# Patient Record
Sex: FEMALE | Race: WHITE | NOT HISPANIC OR LATINO | Employment: FULL TIME | URBAN - METROPOLITAN AREA
[De-identification: names, ages, dates, MRNs, and addresses within clinical notes are randomized per-mention and may not be internally consistent; named-entity substitution may affect disease eponyms.]

---

## 2017-02-23 ENCOUNTER — OFFICE VISIT - HEALTHEAST (OUTPATIENT)
Dept: WOUND CARE | Facility: HOSPITAL | Age: 52
End: 2017-02-23

## 2017-02-23 DIAGNOSIS — C20 RECTAL CANCER (H): ICD-10-CM

## 2017-03-07 ENCOUNTER — AMBULATORY - HEALTHEAST (OUTPATIENT)
Dept: SCHEDULING | Facility: CLINIC | Age: 52
End: 2017-03-07

## 2017-03-07 DIAGNOSIS — Z71.89 OSTOMY NURSE CONSULTATION: ICD-10-CM

## 2017-03-09 ENCOUNTER — OFFICE VISIT - HEALTHEAST (OUTPATIENT)
Dept: WOUND CARE | Facility: HOSPITAL | Age: 52
End: 2017-03-09

## 2017-03-09 DIAGNOSIS — Z71.89 OSTOMY NURSE CONSULTATION: ICD-10-CM

## 2017-05-01 ENCOUNTER — AMBULATORY - HEALTHEAST (OUTPATIENT)
Dept: SCHEDULING | Facility: CLINIC | Age: 52
End: 2017-05-01

## 2017-05-01 DIAGNOSIS — K94.00 COLOSTOMY COMPLICATION (H): ICD-10-CM

## 2017-05-09 ENCOUNTER — OFFICE VISIT - HEALTHEAST (OUTPATIENT)
Dept: WOUND CARE | Facility: HOSPITAL | Age: 52
End: 2017-05-09

## 2017-05-09 DIAGNOSIS — K94.00 COLOSTOMY COMPLICATION (H): ICD-10-CM

## 2017-07-05 ASSESSMENT — MIFFLIN-ST. JEOR: SCORE: 1302.35

## 2017-07-06 ENCOUNTER — ANESTHESIA - HEALTHEAST (OUTPATIENT)
Dept: SURGERY | Facility: HOSPITAL | Age: 52
End: 2017-07-06

## 2017-07-06 ENCOUNTER — SURGERY - HEALTHEAST (OUTPATIENT)
Dept: SURGERY | Facility: HOSPITAL | Age: 52
End: 2017-07-06

## 2017-07-06 ASSESSMENT — MIFFLIN-ST. JEOR: SCORE: 1320.73

## 2017-07-27 ENCOUNTER — OFFICE VISIT - HEALTHEAST (OUTPATIENT)
Dept: WOUND CARE | Facility: HOSPITAL | Age: 52
End: 2017-07-27

## 2017-07-27 DIAGNOSIS — Z43.3 ATTENTION TO COLOSTOMY (H): ICD-10-CM

## 2018-07-18 ENCOUNTER — AMBULATORY - HEALTHEAST (OUTPATIENT)
Dept: SCHEDULING | Facility: CLINIC | Age: 53
End: 2018-07-18

## 2018-07-18 DIAGNOSIS — Z71.89 OSTOMY NURSE CONSULTATION: ICD-10-CM

## 2018-07-26 ENCOUNTER — OFFICE VISIT - HEALTHEAST (OUTPATIENT)
Dept: WOUND CARE | Facility: HOSPITAL | Age: 53
End: 2018-07-26

## 2018-07-26 DIAGNOSIS — Z71.89 OSTOMY NURSE CONSULTATION: ICD-10-CM

## 2018-10-03 ENCOUNTER — AMBULATORY - HEALTHEAST (OUTPATIENT)
Dept: SCHEDULING | Facility: CLINIC | Age: 53
End: 2018-10-03

## 2018-10-03 DIAGNOSIS — Z71.89 ENCOUNTER FOR OSTOMY NURSE CONSULTATION: ICD-10-CM

## 2018-12-10 ENCOUNTER — HOSPITAL ENCOUNTER (OUTPATIENT)
Dept: MAMMOGRAPHY | Facility: CLINIC | Age: 53
Discharge: HOME | End: 2018-12-10
Attending: FAMILY MEDICINE

## 2018-12-10 DIAGNOSIS — Z12.31 VISIT FOR SCREENING MAMMOGRAM: ICD-10-CM

## 2018-12-13 ENCOUNTER — RECORDS - HEALTHEAST (OUTPATIENT)
Dept: ADMINISTRATIVE | Facility: OTHER | Age: 53
End: 2018-12-13

## 2018-12-26 ENCOUNTER — HOSPITAL ENCOUNTER (OUTPATIENT)
Dept: CARDIOLOGY | Facility: HOSPITAL | Age: 53
Discharge: HOME | End: 2018-12-26

## 2018-12-26 ENCOUNTER — RECORDS - HEALTHEAST (OUTPATIENT)
Dept: ADMINISTRATIVE | Facility: OTHER | Age: 53
End: 2018-12-26

## 2018-12-26 DIAGNOSIS — R07.89 CHEST TIGHTNESS: ICD-10-CM

## 2018-12-26 LAB
CV STRESS CURRENT BP HE: NORMAL
CV STRESS CURRENT HR HE: 101
CV STRESS CURRENT HR HE: 104
CV STRESS CURRENT HR HE: 109
CV STRESS CURRENT HR HE: 111
CV STRESS CURRENT HR HE: 112
CV STRESS CURRENT HR HE: 115
CV STRESS CURRENT HR HE: 115
CV STRESS CURRENT HR HE: 118
CV STRESS CURRENT HR HE: 119
CV STRESS CURRENT HR HE: 123
CV STRESS CURRENT HR HE: 126
CV STRESS CURRENT HR HE: 127
CV STRESS CURRENT HR HE: 129
CV STRESS CURRENT HR HE: 132
CV STRESS CURRENT HR HE: 144
CV STRESS CURRENT HR HE: 147
CV STRESS CURRENT HR HE: 163
CV STRESS CURRENT HR HE: 166
CV STRESS CURRENT HR HE: 168
CV STRESS CURRENT HR HE: 168
CV STRESS CURRENT HR HE: 173
CV STRESS CURRENT HR HE: 173
CV STRESS CURRENT HR HE: 58
CV STRESS CURRENT HR HE: 72
CV STRESS CURRENT HR HE: 92
CV STRESS CURRENT HR HE: 93
CV STRESS CURRENT HR HE: 95
CV STRESS CURRENT HR HE: 95
CV STRESS CURRENT HR HE: 96
CV STRESS CURRENT HR HE: 96
CV STRESS CURRENT HR HE: 98
CV STRESS CURRENT HR HE: 98
CV STRESS CURRENT HR HE: 99
CV STRESS DEVIATION TIME HE: NORMAL
CV STRESS ECHO PERCENT HR HE: NORMAL
CV STRESS EXERCISE STAGE HE: NORMAL
CV STRESS FINAL RESTING BP HE: NORMAL
CV STRESS FINAL RESTING HR HE: 98
CV STRESS MAX HR HE: 173
CV STRESS MAX TREADMILL GRADE HE: 18
CV STRESS MAX TREADMILL SPEED HE: 5
CV STRESS PEAK DIA BP HE: NORMAL
CV STRESS PEAK SYS BP HE: NORMAL
CV STRESS PHASE HE: NORMAL
CV STRESS PROTOCOL HE: NORMAL
CV STRESS RESTING PT POSITION HE: NORMAL
CV STRESS RESTING PT POSITION HE: NORMAL
CV STRESS ST DEVIATION AMOUNT HE: NORMAL
CV STRESS ST DEVIATION ELEVATION HE: NORMAL
CV STRESS ST EVELATION AMOUNT HE: NORMAL
CV STRESS TEST TYPE HE: NORMAL
CV STRESS TOTAL STAGE TIME MIN 1 HE: NORMAL
ECHO EJECTION FRACTION ESTIMATED: 60 %
STRESS ECHO BASELINE BP: NORMAL
STRESS ECHO BASELINE HR: 58
STRESS ECHO CALCULATED PERCENT HR: 103 %
STRESS ECHO LAST STRESS BP: NORMAL
STRESS ECHO LAST STRESS HR: 173
STRESS ECHO POST ESTIMATED WORKLOAD: 13.3
STRESS ECHO POST EXERCISE DUR MIN: 12
STRESS ECHO POST EXERCISE DUR SEC: 55
STRESS ECHO TARGET HR: 143

## 2018-12-26 RX ORDER — SOLIFENACIN SUCCINATE 5 MG/1
5 TABLET, FILM COATED ORAL DAILY
Status: SHIPPED | COMMUNITY
Start: 2018-12-26

## 2021-05-25 ENCOUNTER — RECORDS - HEALTHEAST (OUTPATIENT)
Dept: ADMINISTRATIVE | Facility: CLINIC | Age: 56
End: 2021-05-25

## 2021-05-31 VITALS — BODY MASS INDEX: 32.81 KG/M2 | HEIGHT: 61 IN | WEIGHT: 173.8 LBS

## 2021-06-02 ENCOUNTER — RECORDS - HEALTHEAST (OUTPATIENT)
Dept: ADMINISTRATIVE | Facility: CLINIC | Age: 56
End: 2021-06-02

## 2021-06-09 NOTE — PROGRESS NOTES
"OUTPATIENT OSTOMY ASSESSMENT    Patients mode of arrival: Ambulatory    INTAKE  Type of Stoma: Permanent Colostomy  Anticipated date of takedown: NA    Diagnosis Pertinent to Stoma:Colon/Rectal Cancer Date of Diagnosis: 2016  Type of Surgery: Colostomy    Surgery Date: April 2016  [facility]: [facility]    Purpose of this visit:Checkup:for granulomas    Present for Teaching Session: Patient   Present: NA    Pertinent Information: Patient reports that the granulomas \"look bigger than before\"    Current Equipment:    Product # Brand Description   Pouch [id number] Dorinda Flat CTF   Flange      Paste      Powder      Deodorizer                    Pouch Change Frequency: twice weekly  Provider of Care: Emptying: self Pouch Change: self    ASSESSMENT  Stoma Size: Oval 3/4x1-1/8 inches  Protrusion: Flush  Vascularity: Pink  Mucocutaneous Juncture: Intact  Peristomal Skin: Abnormal granuloma at 2 o'clock measuring 0.3x0.3cm and 12 o'clock measuring 0.2x0.2cm. Both bleeding.    Wound: No  Current Wound Care: NA    TREATMENT  Silver Nitrate to : 1 # of Sticks used: granulomas    INTERVENTIONS  Refitting done and Educated on pouch change procedure  Miscellaneous Interventions: NA    INSTRUCTIONS GIVEN  WOC Role, Anatomy, Bathing: Ostomy supplies are waterproof., Pouching Products: Showed pouching system , Insurance and Ordering supplies    PLAN  Change in Supplies: See Outpatient Ostomy Instructions      Total Time Spent with Patient: 25 minutes.  Education time: 10 minutes.  Wound care: 0 minutes.    "

## 2021-06-09 NOTE — PROGRESS NOTES
OUTPATIENT OSTOMY ASSESSMENT    Patients mode of arrival: Ambulatory    INTAKE  Type of Stoma: Permanent Colostomy  Anticipated date of takedown: NA    Diagnosis Pertinent to Stoma:Colon/Rectal Cancer Date of Diagnosis: 1/11/16  Type of Surgery: APR    Surgery Date: 4/28/16  Surgeon:Rakesh Guajardo: [facility]    Purpose of this visit:Leaking Problem    Present for Teaching Session: Patient   Present: NA    Pertinent Information: Patient has stool occasionally getting under pouch    Current Equipment:    Product # Brand Description   Pouch 50353 Ros[address] Flat CTF   Flange      Paste      Powder 7906 [address] Adapt powder   Deodorizer                    Pouch Change Frequency: Every 2 days  Provider of Care: Emptying: self Pouch Change: self    ASSESSMENT  Stoma Size: Oval 1 x 1 1/4 inches  Protrusion: Flush  Vascularity: Pink, 2 granulomas on stoma at 12 o'clock both measuring 0.5x0.5cm  Mucocutaneous Juncture: Intact  Peristomal Skin: Intact    Wound: No  Current Wound Care: NA    TREATMENT  Silver Nitrate to : granulomas # of Sticks used: 1    INTERVENTIONS  Refitting done and Educated on pouch change procedure  Miscellaneous Interventions: Signed up for Coloplast Care or Secure Start    INSTRUCTIONS GIVEN  WOC Role, Activity, Clothing, Fluids: Drink 6-8 glasses of fluids daily  and Assessories     PLAN  Change in Supplies: See Outpatient Ostomy Instructions      Total Time Spent with Patient: 45 minutes.  Education time: 25 minutes.

## 2021-06-10 NOTE — PROGRESS NOTES
OUTPATIENT OSTOMY ASSESSMENT    Patients mode of arrival: Ambulatory    INTAKE  Type of Stoma: Permanent Colostomy  Anticipated date of takedown: NA    Diagnosis Pertinent to Stoma:Colon/Rectal Cancer Date of Diagnosis: 2016  Type of Surgery: Colostomy    Surgery Date: April 2016  Surgeon: Rakesh     [facility]: [facility]    Purpose of this visit:Leaking Problem    Present for Teaching Session: Patient   Present: NA    Pertinent Information: Having frequent leaking issues- at 3 o'clock on the stoma    Current Equipment:    Product # Brand Description   Pouch 49990 Coloplast Soft convex CTF   Flange      Paste [id number] Coloplast Slim barrier ring   Powder      Deodorizer                    Pouch Change Frequency: 5-7 times weekly  Provider of Care: Emptying: self Pouch Change: self    ASSESSMENT  Stoma Size: Oval 3/4x1-1/4 inches  Protrusion: Flush. Os points directly towards 3 o'clock  Vascularity: Pink  Mucocutaneous Juncture: Intact- granuloma at 12 o'clock  Peristomal Skin: Intact    Wound: No  Current Wound Care: NA    TREATMENT  Silver Nitrate to : granuloma # of Sticks used: 1    INTERVENTIONS  Refitting done  Miscellaneous Interventions: NA    INSTRUCTIONS GIVEN  WOC Role, Anatomy, Clothing, Fluids: Drink 6-8 glasses of fluids daily , Pouching Products: Showed pouching system , Insurance and Ordering supplies    PLAN  Change in Supplies: See Outpatient Ostomy Instructions      Total Time Spent with Patient: 45 minutes.  Education time: 30 minutes.  Wound care: 0 minutes.

## 2021-06-11 NOTE — ANESTHESIA POSTPROCEDURE EVALUATION
Patient: Krystyna Hunter  COLOSTOMY REVISION  Anesthesia type: general    Patient location: PACU  Last vitals:   Vitals:    07/06/17 1545   BP: 145/69   Pulse: 74   Resp: 16   Temp: 36.3  C (97.4  F)   SpO2: 98%     Post vital signs: stable  Level of consciousness: awake and responds to simple questions  Post-anesthesia pain: pain controlled  Post-anesthesia nausea and vomiting: no  Pulmonary: unassisted, return to baseline  Cardiovascular: stable and blood pressure at baseline  Hydration: adequate  Anesthetic events: no    QCDR Measures:  ASA# 11 - Yolande-op Cardiac Arrest: ASA11B - Patient did NOT experience unanticipated cardiac arrest  ASA# 12 - Yolande-op Mortality Rate: ASA12B - Patient did NOT die  ASA# 13 - PACU Re-Intubation Rate: ASA13B - Patient did NOT require a new airway mgmt  ASA# 10 - Composite Anes Safety: ASA10A - No serious adverse event  ASA# 38 - New Corneal Injury: ASA38A - No new exposure keratitis or corneal abrasion in PACU    Additional Notes:

## 2021-06-11 NOTE — ANESTHESIA CARE TRANSFER NOTE
Last vitals:   Vitals:    07/06/17 1335   BP: 135/82   Pulse: 83   Resp: 18   Temp: 36.5  C (97.7  F)   SpO2: 100%     Patient's level of consciousness is drowsy  Spontaneous respirations: yes  Maintains airway independently: yes  Dentition unchanged: yes  Oropharynx: oropharynx clear of all foreign objects    QCDR Measures:  ASA# 20 - Surgical Safety Checklist: ASA20A - Safety Checks Done  PQRS# 430 - Adult PONV Prevention: 4558F - Pt received => 2 anti-emetic agents (different classes) preop & intraop  ASA# 8 - Peds PONV Prevention: NA - Not pediatric patient, not GA or 2 or more risk factors NOT present  PQRS# 424 - Yolande-op Temp Management: 4559F - At least one body temp DOCUMENTED => 35.5C or 95.9F within required timeframe  PQRS# 426 - PACU Transfer Protocol: - Transfer of care checklist used  ASA# 14 - Acute Post-op Pain: ASA14B - Patient did NOT experience pain >= 7 out of 10

## 2021-06-12 NOTE — PROGRESS NOTES
[facility]: MED RECONCILIATION    Comments/Recommendations:    Patient is awake and feeling very sick but is able to complete the interview.  Verifies allergies   Drug drug interaction noted between seroquel and citalopram as increased risk of prolonged QTc    Medications added:     · None    Medications removed:    · Adderall 20mg daily    Medications adjusted:    · Vitamin D changed to 2000 units five days per week and 4000 units on Saturday and Sunday  · Adderall changed to 15mg tid prn attention and focus  · Lorazepam changed to 1mg bid - The patient confirms she takes this twice daily scheduled most days  · Quetiapine 25mg changed to one to two tablets at night as needed for insomnia and anxiety    Medication reconciliation completed with patients own medications at the patients bedside  · Counseled the patient to:  · Not use her own medication while in the hospital unless otherwise instructed  · Have a family member take the medication home as soon as possible  · If medications cannot be taken home, request that the patient notify the nurse so the medications may be locked up according to hospital policy  · The briannet reports her spouse Pipo Hester has left for the evening and will not be back until tomorrow  · Nurse notified to contact security to secure medications in accordance with policy [id number]      Allergies: Egg    Prior to Admission Medications:     Prior to Admission Medications   Prescriptions Last Dose Informant Patient Reported? Taking? LORazepam (ATIVAN) 1 mg tablet 9/29/2017 at Unknown time  Yes Yes   Sig: Take 1 mg by mouth two (2) times a day. QUEtiapine (SEROQUEL) 25 mg tablet   Yes Yes   Sig: Take 25-50 mg by mouth nightly as needed (insomnia, anxiety). cholecalciferol, vitamin D3, (VITAMIN D3) 2,000 unit tab 9/29/2017 at Unknown time  Yes Yes   Sig: Take 2,000 Units by mouth five (5) days a week.  The patient takes 2000 units Monday, Tuesday, Wednesday, Thursday, Friday   cholecalciferol, OUTPATIENT OSTOMY ASSESSMENT    Patients mode of arrival: Ambulatory    INTAKE  Type of Stoma: Permanent Colostomy  Anticipated date of takedown: NA    Diagnosis Pertinent to Stoma:Colon/Rectal Cancer Date of Diagnosis: Jan 2016  Type of Surgery: Colostomy revision   Surgery Date: 7/6/17  Surgeon:Rakesh     [facility]: [facility]    Purpose of this visit:Checkup:2 week    Present for Teaching Session: Patient   Present: NA    Pertinent Information: Comes in for routine follow up    Current Equipment:    Product # Brand Description   Pouch 79845 [place] 2 piece    Flange 41982 [place] Convex CTF   Paste 7805 [address] Adapt ring   Powder      Deodorizer                    Pouch Change Frequency: Pouch every 2 days, Flange every 4 days  Provider of Care: Emptying: self Pouch Change: self    ASSESSMENT  Stoma Size: Oval 1 x 1 1/4 inches  Protrusion: 1cm  Vascularity: Pink  Mucocutaneous Juncture: Intact  Peristomal Skin: Intact    Wound: No  Current Wound Care: NA    TREATMENT  NA    INTERVENTIONS  Refitting done and Educated on pouch change procedure  Miscellaneous Interventions: Signed up for Coloplast Care or Secure Start    INSTRUCTIONS GIVEN  WOC Role, Anatomy, Fluids: Drink 6-8 glasses of fluids daily , Pouching Products: Showed pouching system  and Ordering supplies    PLAN  Change in Supplies: See Outpatient Ostomy Instructions      Total Time Spent with Patient: 35 minutes.  Education time: 15 minutes.     vitamin D3, (VITAMIN D3) 2,000 unit tab 9/24/2017  Yes Yes   Sig: Take 4,000 Units by mouth two (2) days a week. The patient takes 4000 units on Saturday and Sunday   citalopram (CELEXA) 20 mg tablet 9/29/2017 at Unknown time  No Yes   Sig: TAKE 1 TABLET BY MOUTH EVERY MORNING   dextroamphetamine-amphetamine (ADDERALL) 15 mg tablet 9/23/2017 at Unknown time  Yes Yes   Sig: Take 15 mg by mouth three (3) times daily as needed (focus, attention). dilTIAZem CD (CARDIZEM CD) 180 mg ER capsule 9/29/2017 at Unknown time  No Yes   Sig: Take 1 Cap by mouth daily. levothyroxine (SYNTHROID) 112 mcg tablet 9/29/2017 at Unknown time  Yes Yes   Sig: Take 112 mcg by mouth Daily (before breakfast). nitrofurantoin, macrocrystal-monohydrate, (MACROBID) 100 mg capsule 9/29/2017 at Unknown time  No Yes   Sig: Take 1 Cap by mouth two (2) times a day. phenazopyridine (PYRIDIUM) 100 mg tablet   No Yes   Sig: Take 1 Tab by mouth three (3) times daily as needed for Pain for up to 3 days.  Caution can cause orange staining on clothes from sweating, tears, urine   simvastatin (ZOCOR) 10 mg tablet 9/29/2017 at Unknown time  No Yes   Sig: TAKE 1 TABLET BY MOUTH NIGHTLY   venlafaxine-SR (EFFEXOR-XR) 150 mg capsule 9/29/2017 at Unknown time  No Yes   Sig: TAKE ONE CAPSULE BY MOUTH EVERY DAY      Facility-Administered Medications: None      Thank you,    Filomena Osgood, PharmD, BCPS

## 2021-06-19 NOTE — PROGRESS NOTES
OUTPATIENT OSTOMY ASSESSMENT    Patients mode of arrival: Ambulatory    INTAKE  Type of Stoma: Permanent Colostomy  Anticipated date of takedown: NA    Diagnosis Pertinent to Stoma:Colon/Rectal Cancer Date of Diagnosis: Jan 2016  Type of Surgery: Colostomy    Surgery Date: Revision 7/6/17  Surgeon:Rakesh     [facility]: [facility]    Purpose of this visit:Checkup:1 year    Present for Teaching Session: Patient   Present: NA    Pertinent Information: One irritated spot on stoma    Current Equipment:    Product # Brand Description   Pouch 86159 Coloplast 2 piece   Flange 15725 Coloplast 1 1/4 inch convex light   Paste 998494 Coloplast Thin ring   Powder      Deodorizer                    Pouch Change Frequency: Every 2-3 days  Provider of Care: Emptying: self Pouch Change: self    ASSESSMENT  Stoma Size: Oval 1 x 1 1/8 inches  Protrusion: Flush  Vascularity: Pink, 2 granulomas on stoma each measuring 0.2x0.2cm  Mucocutaneous Juncture: Intact  Peristomal Skin: Intact    Wound: No  Current Wound Care: NA    TREATMENT  Silver Nitrate to : granulomas # of Sticks used: 1    INTERVENTIONS  Refitting done    INSTRUCTIONS GIVEN  WOC Role, Anatomy, Clothing, Diet, Exercise, Fluids: Drink 6-8 glasses of fluids daily , Insurance and Ordering supplies    PLAN  Continue same Pouching System      Total Time Spent with Patient: 30 minutes.  Education time: 10 minutes.

## 2021-06-27 ENCOUNTER — HEALTH MAINTENANCE LETTER (OUTPATIENT)
Age: 56
End: 2021-06-27

## 2021-07-03 NOTE — ANESTHESIA PREPROCEDURE EVALUATION
Anesthesia Preprocedure Evaluation by Aldo Singer MD at 7/6/2017 12:07 PM     Author: Aldo Singer MD Service: -- Author Type: Physician    Filed: 7/6/2017 12:08 PM Date of Service: 7/6/2017 12:07 PM Status: Addendum    : Aldo Singer MD (Physician)    Related Notes: Original Note by Aldo Singer MD (Physician) filed at 7/6/2017 12:07 PM       Anesthesia Evaluation      Patient summary reviewed     Airway   Mallampati: II  Neck ROM: full   Pulmonary - negative ROS and normal exam    breath sounds clear to auscultation                         Cardiovascular - negative ROS  Rhythm: regular  Rate: normal,         Neuro/Psych      Endo/Other       GI/Hepatic/Renal - negative ROS      Other findings: Colon cancer      Dental    (+) caps                           Anesthesia Plan  Planned anesthetic: general endotracheal    ASA 2   Induction: intravenous   Anesthetic plan and risks discussed with: patient    Post-op plan: routine recovery

## 2021-07-13 ENCOUNTER — RECORDS - HEALTHEAST (OUTPATIENT)
Dept: ADMINISTRATIVE | Facility: CLINIC | Age: 56
End: 2021-07-13

## 2021-07-21 ENCOUNTER — RECORDS - HEALTHEAST (OUTPATIENT)
Dept: ADMINISTRATIVE | Facility: CLINIC | Age: 56
End: 2021-07-21

## 2021-07-22 ENCOUNTER — RECORDS - HEALTHEAST (OUTPATIENT)
Dept: SCHEDULING | Facility: CLINIC | Age: 56
End: 2021-07-22

## 2021-07-22 DIAGNOSIS — Z12.31 OTHER SCREENING MAMMOGRAM: ICD-10-CM

## 2021-09-03 ENCOUNTER — TELEPHONE (OUTPATIENT)
Dept: WOUND CARE | Facility: HOSPITAL | Age: 56
End: 2021-09-03

## 2021-10-01 DIAGNOSIS — Z11.59 ENCOUNTER FOR SCREENING FOR OTHER VIRAL DISEASES: ICD-10-CM

## 2021-10-17 ENCOUNTER — HEALTH MAINTENANCE LETTER (OUTPATIENT)
Age: 56
End: 2021-10-17

## 2021-10-19 ENCOUNTER — LAB (OUTPATIENT)
Dept: FAMILY MEDICINE | Facility: CLINIC | Age: 56
End: 2021-10-19
Payer: COMMERCIAL

## 2021-10-19 ENCOUNTER — HOSPITAL ENCOUNTER (OUTPATIENT)
Dept: WOUND CARE | Facility: HOSPITAL | Age: 56
Discharge: HOME | End: 2021-10-19
Attending: COLON & RECTAL SURGERY | Admitting: COLON & RECTAL SURGERY
Payer: COMMERCIAL

## 2021-10-19 DIAGNOSIS — Z11.59 ENCOUNTER FOR SCREENING FOR OTHER VIRAL DISEASES: ICD-10-CM

## 2021-10-19 PROCEDURE — U0005 INFEC AGEN DETEC AMPLI PROBE: HCPCS | Performed by: OBSTETRICS & GYNECOLOGY

## 2021-10-19 PROCEDURE — 999N000196 HC STATISTIC WOC PT EDUCATION, > 60 MIN

## 2021-10-19 NOTE — DISCHARGE INSTRUCTIONS
OUTPATIENT OSTOMY INSTRUCTIONS                                                                        Type of Stoma: Colostomy         [phone]      Equipment:    Product # Brand Description   Pouch  Coloplast 2 pc Drainable   Flange   * convex   Paste Coloplast Coloplast Brava   Powder 7906 [address]inda Adapt   Liquid Skin Barrier 3344 3M Cavilon No Sting               Deodorizer   Coloplast   Optional pouch           Procedure:  Close the bottom of the pouch.  If needed cut the opening of your pouch to the correct size (follow pattern or 1/8 inch larger than stoma) and then remove backings from adhesive surfaces.  Remove the soiled pouch and discard.  Wash skin with water and dry.    Then: Apply powder to open areas only, brush off excess powder., Apply No-Sting over powdered area. and Apply Skin Prep over powdered area.  Then: Apply Ring/Paste: Around stoma.  in order to cover top and bottom skin           Then: Apply pouching system to stoma site and hold in place for 2-5 minutes.     ______________________________________________________________________________    Pouch Change: Twice weekly       WOC Nurse Specialist: Aubree                  Questions: [facility] [phone] ()      Follow-up Appointment: As needed. Please call [facility] [phone] () to request an appointment.

## 2021-10-20 ENCOUNTER — ANESTHESIA EVENT (OUTPATIENT)
Dept: SURGERY | Facility: AMBULATORY SURGERY CENTER | Age: 56
End: 2021-10-20
Payer: COMMERCIAL

## 2021-10-20 LAB — SARS-COV-2 RNA RESP QL NAA+PROBE: NEGATIVE

## 2021-10-20 ASSESSMENT — MIFFLIN-ST. JEOR: SCORE: 1285.35

## 2021-10-21 ENCOUNTER — ANESTHESIA (OUTPATIENT)
Dept: SURGERY | Facility: AMBULATORY SURGERY CENTER | Age: 56
End: 2021-10-21
Payer: COMMERCIAL

## 2021-10-21 ENCOUNTER — HOSPITAL ENCOUNTER (OUTPATIENT)
Facility: AMBULATORY SURGERY CENTER | Age: 56
End: 2021-10-21
Attending: OBSTETRICS & GYNECOLOGY
Payer: COMMERCIAL

## 2021-10-21 VITALS
WEIGHT: 166 LBS | BODY MASS INDEX: 31.34 KG/M2 | TEMPERATURE: 96 F | OXYGEN SATURATION: 99 % | DIASTOLIC BLOOD PRESSURE: 72 MMHG | HEIGHT: 61 IN | RESPIRATION RATE: 16 BRPM | SYSTOLIC BLOOD PRESSURE: 142 MMHG | HEART RATE: 60 BPM

## 2021-10-21 DIAGNOSIS — T83.39XA RETAINED INTRAUTERINE CONTRACEPTIVE DEVICE (IUD): Primary | ICD-10-CM

## 2021-10-21 RX ORDER — IBUPROFEN 800 MG/1
800 TABLET, FILM COATED ORAL EVERY 6 HOURS PRN
Qty: 30 TABLET | Refills: 0 | Status: SHIPPED | OUTPATIENT
Start: 2021-10-21

## 2021-10-21 RX ORDER — ONDANSETRON 2 MG/ML
4 INJECTION INTRAMUSCULAR; INTRAVENOUS EVERY 30 MIN PRN
Status: DISCONTINUED | OUTPATIENT
Start: 2021-10-21 | End: 2021-10-22 | Stop reason: HOSPADM

## 2021-10-21 RX ORDER — IBUPROFEN 800 MG/1
800 TABLET, FILM COATED ORAL ONCE
Status: COMPLETED | OUTPATIENT
Start: 2021-10-21 | End: 2021-10-21

## 2021-10-21 RX ORDER — PROPOFOL 10 MG/ML
INJECTION, EMULSION INTRAVENOUS CONTINUOUS PRN
Status: DISCONTINUED | OUTPATIENT
Start: 2021-10-21 | End: 2021-10-21

## 2021-10-21 RX ORDER — SODIUM CHLORIDE, SODIUM LACTATE, POTASSIUM CHLORIDE, CALCIUM CHLORIDE 600; 310; 30; 20 MG/100ML; MG/100ML; MG/100ML; MG/100ML
INJECTION, SOLUTION INTRAVENOUS CONTINUOUS
Status: DISCONTINUED | OUTPATIENT
Start: 2021-10-21 | End: 2021-10-22 | Stop reason: HOSPADM

## 2021-10-21 RX ORDER — ONDANSETRON 4 MG/1
4 TABLET, ORALLY DISINTEGRATING ORAL EVERY 30 MIN PRN
Status: DISCONTINUED | OUTPATIENT
Start: 2021-10-21 | End: 2021-10-22 | Stop reason: HOSPADM

## 2021-10-21 RX ORDER — TRIAMCINOLONE ACETONIDE 0.25 MG/G
CREAM TOPICAL 2 TIMES DAILY
COMMUNITY

## 2021-10-21 RX ORDER — ACETAMINOPHEN 325 MG/1
975 TABLET ORAL ONCE
Status: DISCONTINUED | OUTPATIENT
Start: 2021-10-21 | End: 2021-10-22 | Stop reason: HOSPADM

## 2021-10-21 RX ORDER — FLUOROURACIL 50 MG/G
CREAM TOPICAL 2 TIMES DAILY
COMMUNITY

## 2021-10-21 RX ORDER — LIDOCAINE HYDROCHLORIDE 20 MG/ML
INJECTION, SOLUTION INFILTRATION; PERINEURAL PRN
Status: DISCONTINUED | OUTPATIENT
Start: 2021-10-21 | End: 2021-10-21

## 2021-10-21 RX ORDER — CLOBETASOL PROPIONATE 0.5 MG/G
CREAM TOPICAL 2 TIMES DAILY
COMMUNITY

## 2021-10-21 RX ORDER — CEPHALEXIN 500 MG/1
500 CAPSULE ORAL 2 TIMES DAILY
Qty: 10 CAPSULE | Refills: 0 | Status: SHIPPED | OUTPATIENT
Start: 2021-10-21 | End: 2021-10-26

## 2021-10-21 RX ORDER — FENTANYL CITRATE 50 UG/ML
25 INJECTION, SOLUTION INTRAMUSCULAR; INTRAVENOUS
Status: DISCONTINUED | OUTPATIENT
Start: 2021-10-21 | End: 2021-10-22 | Stop reason: HOSPADM

## 2021-10-21 RX ORDER — FENTANYL CITRATE 50 UG/ML
25 INJECTION, SOLUTION INTRAMUSCULAR; INTRAVENOUS EVERY 5 MIN PRN
Status: CANCELLED | OUTPATIENT
Start: 2021-10-21

## 2021-10-21 RX ORDER — ONDANSETRON 2 MG/ML
INJECTION INTRAMUSCULAR; INTRAVENOUS PRN
Status: DISCONTINUED | OUTPATIENT
Start: 2021-10-21 | End: 2021-10-21

## 2021-10-21 RX ORDER — DEXAMETHASONE SODIUM PHOSPHATE 4 MG/ML
INJECTION, SOLUTION INTRA-ARTICULAR; INTRALESIONAL; INTRAMUSCULAR; INTRAVENOUS; SOFT TISSUE PRN
Status: DISCONTINUED | OUTPATIENT
Start: 2021-10-21 | End: 2021-10-21

## 2021-10-21 RX ORDER — LIDOCAINE 40 MG/G
CREAM TOPICAL
Status: DISCONTINUED | OUTPATIENT
Start: 2021-10-21 | End: 2021-10-22 | Stop reason: HOSPADM

## 2021-10-21 RX ORDER — PHENAZOPYRIDINE HYDROCHLORIDE 100 MG/1
100 TABLET, FILM COATED ORAL 3 TIMES DAILY PRN
Qty: 10 TABLET | Refills: 0 | Status: SHIPPED | OUTPATIENT
Start: 2021-10-21 | End: 2021-10-24

## 2021-10-21 RX ORDER — FENTANYL CITRATE 50 UG/ML
INJECTION, SOLUTION INTRAMUSCULAR; INTRAVENOUS PRN
Status: DISCONTINUED | OUTPATIENT
Start: 2021-10-21 | End: 2021-10-21

## 2021-10-21 RX ADMIN — IBUPROFEN 800 MG: 800 TABLET, FILM COATED ORAL at 11:52

## 2021-10-21 RX ADMIN — FENTANYL CITRATE 25 MCG: 50 INJECTION, SOLUTION INTRAMUSCULAR; INTRAVENOUS at 11:01

## 2021-10-21 RX ADMIN — FENTANYL CITRATE 50 MCG: 50 INJECTION, SOLUTION INTRAMUSCULAR; INTRAVENOUS at 10:51

## 2021-10-21 RX ADMIN — LIDOCAINE HYDROCHLORIDE 40 MG: 20 INJECTION, SOLUTION INFILTRATION; PERINEURAL at 10:51

## 2021-10-21 RX ADMIN — DEXAMETHASONE SODIUM PHOSPHATE 4 MG: 4 INJECTION, SOLUTION INTRA-ARTICULAR; INTRALESIONAL; INTRAMUSCULAR; INTRAVENOUS; SOFT TISSUE at 10:55

## 2021-10-21 RX ADMIN — PROPOFOL 200 MCG/KG/MIN: 10 INJECTION, EMULSION INTRAVENOUS at 10:51

## 2021-10-21 RX ADMIN — FENTANYL CITRATE 25 MCG: 50 INJECTION, SOLUTION INTRAMUSCULAR; INTRAVENOUS at 10:53

## 2021-10-21 RX ADMIN — SODIUM CHLORIDE, SODIUM LACTATE, POTASSIUM CHLORIDE, CALCIUM CHLORIDE: 600; 310; 30; 20 INJECTION, SOLUTION INTRAVENOUS at 10:37

## 2021-10-21 RX ADMIN — ONDANSETRON 4 MG: 2 INJECTION INTRAMUSCULAR; INTRAVENOUS at 10:55

## 2021-10-21 ASSESSMENT — MIFFLIN-ST. JEOR: SCORE: 1285.35

## 2021-10-21 NOTE — ANESTHESIA CARE TRANSFER NOTE
Patient: Krystyna Hunter    Procedure: Procedure(s):  HYSTEROSCOPY, WITH DILATION AND CURETTAGE, WITH REMOVAL OF MIRENA DEVICE UNDER ULTRASOUND GUIDANCE       Diagnosis: Retained intrauterine contraceptive device (IUD) [T83.39XA]  Diagnosis Additional Information: No value filed.    Anesthesia Type:   MAC     Note:    Oropharynx: oropharynx clear of all foreign objects and spontaneously breathing  Level of Consciousness: awake  Oxygen Supplementation: room air    Independent Airway: airway patency satisfactory and stable    Vital Signs Stable: post-procedure vital signs reviewed and stable  Report to RN Given: handoff report given  Patient transferred to: Phase II    Handoff Report: Identifed the Patient, Identified the Reponsible Provider, Reviewed the pertinent medical history, Discussed the surgical course, Reviewed Intra-OP anesthesia mangement and issues during anesthesia, Set expectations for post-procedure period and Allowed opportunity for questions and acknowledgement of understanding      Vitals:  Vitals Value Taken Time   /68 10/21/21 1109   Temp 96  F (35.6  C) 10/21/21 1109   Pulse 79 10/21/21 1109   Resp 16 10/21/21 1109   SpO2 95 % 10/21/21 1109       Electronically Signed By: BLAKE Petersen CRNA  October 21, 2021  11:11 AM

## 2021-10-21 NOTE — ANESTHESIA PREPROCEDURE EVALUATION
Anesthesia Pre-Procedure Evaluation    Patient: Krystyna Hunter   MRN: [id number] : 1965        Preoperative Diagnosis: Retained intrauterine contraceptive device (IUD) [T83.39XA]    Procedure : Procedure(s):  HYSTEROSCOPY, WITH DILATION AND CURETTAGE, WITH REMOVAL OF MIRENA DEVICE UNDER ULTRASOUND GUIDANCE          Past Medical History:   Diagnosis Date     Dyspnea on exertion      Gastroesophageal reflux disease      Orthopnea      Thyroid disease       Past Surgical History:   Procedure Laterality Date     COLON SURGERY  2016    with colostomy     REPAIR BLADDER  2014     REVISION COLOSTOMY N/A 2017    Procedure: COLOSTOMY REVISION;  Surgeon: Shant Cameron MD;  Location: [facility];  Service:       Allergies   Allergen Reactions     Claritin-D 12 Hour [Claritin-D] Hives     Hydrocodone Unknown     Facial swelling     Nitrofurantoin Other (See Comments) and Swelling     numbness      Social History     Tobacco Use     Smoking status: Never Smoker     Smokeless tobacco: Never Used   Substance Use Topics     Alcohol use: Yes     Comment: Alcoholic Drinks/day: occasionaly, 2 times per week 3-4 glasses of adult beverages      Wt Readings from Last 1 Encounters:   10/21/21 75.3 kg (166 lb)        Anesthesia Evaluation   Pt has had prior anesthetic.     No history of anesthetic complications       ROS/MED HX  ENT/Pulmonary:  - neg pulmonary ROS     Neurologic:  - neg neurologic ROS     Cardiovascular:  - neg cardiovascular ROS     METS/Exercise Tolerance:     Hematologic:  - neg hematologic  ROS     Musculoskeletal:  - neg musculoskeletal ROS     GI/Hepatic:     (+) GERD, Asymptomatic on medication,     Renal/Genitourinary:  - neg Renal ROS     Endo:     (+) thyroid problem, hypothyroidism,     Psychiatric/Substance Use:  - neg psychiatric ROS     Infectious Disease:  - neg infectious disease ROS     Malignancy: Comment: Hx of rectal cancer s/p chemotherapy, radiation and surgical resection  with ostomy in place      Other:  - neg other ROS          Physical Exam    Airway  airway exam normal      Mallampati: III   TM distance: > 3 FB   Neck ROM: full   Mouth opening: > 3 cm    Respiratory Devices and Support         Dental  no notable dental history         Cardiovascular   cardiovascular exam normal       Rhythm and rate: regular and normal     Pulmonary   pulmonary exam normal        breath sounds clear to auscultation           OUTSIDE LABS:  CBC: No results found for: WBC, HGB, HCT, PLT  BMP:   Lab Results   Component Value Date    CR 0.72 04/28/2016     COAGS: No results found for: PTT, INR, FIBR  POC: No results found for: BGM, HCG, HCGS  HEPATIC: No results found for: ALBUMIN, PROTTOTAL, ALT, AST, GGT, ALKPHOS, BILITOTAL, BILIDIRECT, WILBUR  OTHER: No results found for: PH, LACT, A1C, BILLY, PHOS, MAG, LIPASE, AMYLASE, TSH, T4, T3, CRP, SED    Anesthesia Plan    ASA Status:  2   NPO Status:  NPO Appropriate    Anesthesia Type: MAC.     - Reason for MAC: straight local not clinically adequate              Consents    Anesthesia Plan(s) and associated risks, benefits, and realistic alternatives discussed. Questions answered and patient/representative(s) expressed understanding.     - Discussed with:  Patient         Postoperative Care    Pain management: IV analgesics, Oral pain medications, Multi-modal analgesia.   PONV prophylaxis: Ondansetron (or other 5HT-3), Dexamethasone or Solumedrol, Droperidol or Haldol, Background Propofol Infusion     Comments:                JULISA YOUNG MD

## 2021-10-21 NOTE — OP NOTE
Hysteroscopy, D&C, Removal of IUD    Name: Krystyna Hunter   MRN: [id number]   DATE OF SERVICE:  10/21/2021     PREOPERATIVE DIAGNOSIS: retained Mirena IUD    POSTOPERATIVE DIAGNOSIS: same    PROCEDURES: Removal of IUD under US guidance    SURGEON: Princess Lan DO     ASSISTANT: none      ANESTHESIA:  mac      ESTIMATED BLOOD LOSS:   0 cc      HISTORY OF PRESENT ILLNESS:  This is a 56 year old year old female with history of retained IUD. Patient has history of placement of IUD in 2015 and subsequently was diagnosed with colon cancer and underwent radiation. Attempt to remove IUD in the office was unsuccessful so decision was made to complete in the OR under US guidance. She was given informed consent for the above procedure. The risks, benefits and alternatives were discussed with her. She expressed understanding and wished to proceed.       PROCEDURE:  The patient was brought to the operating room and after induction of general anesthesia was prepped and draped in the dorsal lithotomy position. A time out was called and the patient and the procedure were verified.  A bimanual exam was done, indicating cervix flush with vaginal wall, small anteverted uterus.     A sterile  speculum was placed. The cervix was again noted to be flush with vaginal wall but external os able to be identified due to IUD strings. Just above the IUD stings the anterior lip of the cervix was grasped with single tooth tenaculum. US guidance was used but it was difficult to identify the uterus as patient had an empty bladder, tenaculum placement was confirmed to be on the cervix. A ring forcep was then used to grasp the IUD. Steady downward traction allowed for removal of the mirena IUD which was inspected and removed in its entirety. No bleeding noted from the os at this time. The tenaculum was removed from the cervix and at this point good hemostasis was noted with this portion of the procedure. Instruments were removed from vagina. No  active bleeding was noted. Sponge and needle counts were correct X 2. She was taken to recovery in stable condition.      Princess Lan,   [facility]  [phone]

## 2021-10-21 NOTE — DISCHARGE INSTRUCTIONS
Ibuprofen last given at 11:55am. Do not take any additional NSAID's (Ibuprofen, Motrin, Advil, Naproxen, Aleve) until 5:55pm or later. Take with food.    Call Dr. Lan office if you have any questions or concerns:  [phone]

## 2021-10-21 NOTE — ANESTHESIA POSTPROCEDURE EVALUATION
Patient: Krystyna Hunter    Procedure: Procedure(s):  HYSTEROSCOPY, WITH DILATION AND CURETTAGE, WITH REMOVAL OF MIRENA DEVICE UNDER ULTRASOUND GUIDANCE       Diagnosis:Retained intrauterine contraceptive device (IUD) [T83.39XA]  Diagnosis Additional Information: No value filed.    Anesthesia Type:  MAC    Note:  Disposition: Outpatient   Postop Pain Control: Uneventful            Sign Out: Well controlled pain   PONV: No   Neuro/Psych: Uneventful            Sign Out: Acceptable/Baseline neuro status   Airway/Respiratory: Uneventful            Sign Out: Acceptable/Baseline resp. status   CV/Hemodynamics: Uneventful            Sign Out: Acceptable CV status; No obvious hypovolemia; No obvious fluid overload   Other NRE: NONE   DID A NON-ROUTINE EVENT OCCUR? No           Last vitals:  Vitals Value Taken Time   /72 10/21/21 1200   Temp 96  F (35.6  C) 10/21/21 1109   Pulse 66 10/21/21 1204   Resp 16 10/21/21 1109   SpO2 96 % 10/21/21 1204   Vitals shown include unvalidated device data.    Electronically Signed By: JULISA YOUNG MD  October 21, 2021  12:43 PM

## 2021-10-21 NOTE — INTERVAL H&P NOTE
I have reviewed the surgical (or preoperative) H&P that is linked to this encounter, and examined the patient. There are no significant changes.    Princess Lan, DO  [facility]  [phone]

## 2022-10-02 ENCOUNTER — HEALTH MAINTENANCE LETTER (OUTPATIENT)
Age: 57
End: 2022-10-02

## 2023-06-01 ENCOUNTER — HOSPITAL ENCOUNTER (OUTPATIENT)
Dept: MAMMOGRAPHY | Facility: CLINIC | Age: 58
Discharge: HOME | End: 2023-06-01
Attending: OBSTETRICS & GYNECOLOGY
Payer: COMMERCIAL

## 2023-06-01 DIAGNOSIS — R22.30 AXILLARY FULLNESS: ICD-10-CM

## 2023-06-01 DIAGNOSIS — M79.629: ICD-10-CM

## 2023-06-01 DIAGNOSIS — N64.4 PAIN OF BOTH BREASTS: ICD-10-CM

## 2023-06-01 PROCEDURE — 76642 ULTRASOUND BREAST LIMITED: CPT | Mod: 50

## 2023-06-01 PROCEDURE — 77062 BREAST TOMOSYNTHESIS BI: CPT

## 2023-10-21 ENCOUNTER — HEALTH MAINTENANCE LETTER (OUTPATIENT)
Age: 58
End: 2023-10-21

## 2024-12-14 ENCOUNTER — HEALTH MAINTENANCE LETTER (OUTPATIENT)
Age: 59
End: 2024-12-14

## 2025-02-27 PROBLEM — N95.0 POSTMENOPAUSAL BLEEDING: Status: ACTIVE | Noted: 2025-02-27

## 2025-02-27 RX ORDER — PROGESTERONE 200 MG/1
CAPSULE ORAL
COMMUNITY
Start: 2023-04-04

## 2025-02-28 ENCOUNTER — ANESTHESIA EVENT (OUTPATIENT)
Dept: SURGERY | Facility: AMBULATORY SURGERY CENTER | Age: 60
End: 2025-02-28

## 2025-03-03 ENCOUNTER — HOSPITAL ENCOUNTER (OUTPATIENT)
Facility: AMBULATORY SURGERY CENTER | Age: 60
Discharge: HOME | End: 2025-03-03
Attending: OBSTETRICS & GYNECOLOGY

## 2025-03-03 ENCOUNTER — ANESTHESIA (OUTPATIENT)
Dept: SURGERY | Facility: AMBULATORY SURGERY CENTER | Age: 60
End: 2025-03-03

## 2025-03-03 VITALS
WEIGHT: 170 LBS | DIASTOLIC BLOOD PRESSURE: 89 MMHG | HEART RATE: 64 BPM | SYSTOLIC BLOOD PRESSURE: 134 MMHG | TEMPERATURE: 97 F | RESPIRATION RATE: 13 BRPM | OXYGEN SATURATION: 95 % | HEIGHT: 61 IN | BODY MASS INDEX: 32.1 KG/M2

## 2025-03-03 DIAGNOSIS — N95.0 PMB (POSTMENOPAUSAL BLEEDING): ICD-10-CM

## 2025-03-03 DIAGNOSIS — N95.2 VAGINAL ATROPHY: ICD-10-CM

## 2025-03-03 DIAGNOSIS — N95.0 POSTMENOPAUSAL BLEEDING: Primary | ICD-10-CM

## 2025-03-03 RX ORDER — ONDANSETRON 2 MG/ML
4 INJECTION INTRAMUSCULAR; INTRAVENOUS EVERY 30 MIN PRN
Status: DISCONTINUED | OUTPATIENT
Start: 2025-03-03 | End: 2025-03-04 | Stop reason: HOSPADM

## 2025-03-03 RX ORDER — ACETAMINOPHEN 325 MG/1
975 TABLET ORAL ONCE
Status: DISCONTINUED | OUTPATIENT
Start: 2025-03-03 | End: 2025-03-04 | Stop reason: HOSPADM

## 2025-03-03 RX ORDER — FENTANYL CITRATE 50 UG/ML
INJECTION, SOLUTION INTRAMUSCULAR; INTRAVENOUS PRN
Status: DISCONTINUED | OUTPATIENT
Start: 2025-03-03 | End: 2025-03-03

## 2025-03-03 RX ORDER — PROPOFOL 10 MG/ML
INJECTION, EMULSION INTRAVENOUS PRN
Status: DISCONTINUED | OUTPATIENT
Start: 2025-03-03 | End: 2025-03-03

## 2025-03-03 RX ORDER — KETOROLAC TROMETHAMINE 30 MG/ML
INJECTION, SOLUTION INTRAMUSCULAR; INTRAVENOUS PRN
Status: DISCONTINUED | OUTPATIENT
Start: 2025-03-03 | End: 2025-03-03

## 2025-03-03 RX ORDER — OXYCODONE HYDROCHLORIDE 5 MG/1
5 TABLET ORAL
Status: DISCONTINUED | OUTPATIENT
Start: 2025-03-03 | End: 2025-03-04 | Stop reason: HOSPADM

## 2025-03-03 RX ORDER — ACETAMINOPHEN 325 MG/1
975 TABLET ORAL ONCE
Status: COMPLETED | OUTPATIENT
Start: 2025-03-03 | End: 2025-03-03

## 2025-03-03 RX ORDER — PROPOFOL 10 MG/ML
INJECTION, EMULSION INTRAVENOUS CONTINUOUS PRN
Status: DISCONTINUED | OUTPATIENT
Start: 2025-03-03 | End: 2025-03-03

## 2025-03-03 RX ORDER — DEXAMETHASONE SODIUM PHOSPHATE 4 MG/ML
4 INJECTION, SOLUTION INTRA-ARTICULAR; INTRALESIONAL; INTRAMUSCULAR; INTRAVENOUS; SOFT TISSUE
Status: DISCONTINUED | OUTPATIENT
Start: 2025-03-03 | End: 2025-03-04 | Stop reason: HOSPADM

## 2025-03-03 RX ORDER — ONDANSETRON 4 MG/1
4 TABLET, ORALLY DISINTEGRATING ORAL EVERY 30 MIN PRN
Status: DISCONTINUED | OUTPATIENT
Start: 2025-03-03 | End: 2025-03-04 | Stop reason: HOSPADM

## 2025-03-03 RX ORDER — DEXAMETHASONE SODIUM PHOSPHATE 4 MG/ML
INJECTION, SOLUTION INTRA-ARTICULAR; INTRALESIONAL; INTRAMUSCULAR; INTRAVENOUS; SOFT TISSUE PRN
Status: DISCONTINUED | OUTPATIENT
Start: 2025-03-03 | End: 2025-03-03

## 2025-03-03 RX ORDER — IBUPROFEN 800 MG/1
800 TABLET, FILM COATED ORAL ONCE
Status: DISCONTINUED | OUTPATIENT
Start: 2025-03-03 | End: 2025-03-04 | Stop reason: HOSPADM

## 2025-03-03 RX ORDER — LIDOCAINE 40 MG/G
CREAM TOPICAL
Status: DISCONTINUED | OUTPATIENT
Start: 2025-03-03 | End: 2025-03-04 | Stop reason: HOSPADM

## 2025-03-03 RX ORDER — NALOXONE HYDROCHLORIDE 0.4 MG/ML
0.1 INJECTION, SOLUTION INTRAMUSCULAR; INTRAVENOUS; SUBCUTANEOUS
Status: DISCONTINUED | OUTPATIENT
Start: 2025-03-03 | End: 2025-03-04 | Stop reason: HOSPADM

## 2025-03-03 RX ORDER — OXYCODONE HYDROCHLORIDE 10 MG/1
10 TABLET ORAL
Status: DISCONTINUED | OUTPATIENT
Start: 2025-03-03 | End: 2025-03-04 | Stop reason: HOSPADM

## 2025-03-03 RX ORDER — ONDANSETRON 2 MG/ML
INJECTION INTRAMUSCULAR; INTRAVENOUS PRN
Status: DISCONTINUED | OUTPATIENT
Start: 2025-03-03 | End: 2025-03-03

## 2025-03-03 RX ORDER — SODIUM CHLORIDE, SODIUM LACTATE, POTASSIUM CHLORIDE, CALCIUM CHLORIDE 600; 310; 30; 20 MG/100ML; MG/100ML; MG/100ML; MG/100ML
INJECTION, SOLUTION INTRAVENOUS CONTINUOUS
Status: DISCONTINUED | OUTPATIENT
Start: 2025-03-03 | End: 2025-03-04 | Stop reason: HOSPADM

## 2025-03-03 RX ORDER — ESTRADIOL 0.1 MG/G
1 CREAM VAGINAL
Qty: 42.5 G | Refills: 2 | Status: SHIPPED | OUTPATIENT
Start: 2025-03-03

## 2025-03-03 RX ADMIN — DEXAMETHASONE SODIUM PHOSPHATE 4 MG: 4 INJECTION, SOLUTION INTRA-ARTICULAR; INTRALESIONAL; INTRAMUSCULAR; INTRAVENOUS; SOFT TISSUE at 07:05

## 2025-03-03 RX ADMIN — KETOROLAC TROMETHAMINE 15 MG: 30 INJECTION, SOLUTION INTRAMUSCULAR; INTRAVENOUS at 07:32

## 2025-03-03 RX ADMIN — FENTANYL CITRATE 50 MCG: 50 INJECTION, SOLUTION INTRAMUSCULAR; INTRAVENOUS at 07:09

## 2025-03-03 RX ADMIN — PROPOFOL 40 MG: 10 INJECTION, EMULSION INTRAVENOUS at 07:02

## 2025-03-03 RX ADMIN — SODIUM CHLORIDE, SODIUM LACTATE, POTASSIUM CHLORIDE, CALCIUM CHLORIDE: 600; 310; 30; 20 INJECTION, SOLUTION INTRAVENOUS at 06:18

## 2025-03-03 RX ADMIN — ONDANSETRON 4 MG: 2 INJECTION INTRAMUSCULAR; INTRAVENOUS at 07:05

## 2025-03-03 RX ADMIN — ACETAMINOPHEN 975 MG: 325 TABLET ORAL at 06:10

## 2025-03-03 RX ADMIN — PROPOFOL 200 MCG/KG/MIN: 10 INJECTION, EMULSION INTRAVENOUS at 07:02

## 2025-03-03 RX ADMIN — PROPOFOL 80 MG: 10 INJECTION, EMULSION INTRAVENOUS at 07:25

## 2025-03-03 NOTE — ANESTHESIA POSTPROCEDURE EVALUATION
Are we scheduling a Rt L3 L4 Tfesi?  I do not recall getting an order for him Patient: Krystyna Hunter    Procedure: Procedure(s):  Exam under anesthsia with endometrial biopsy       Anesthesia Type:  General    Note:  Disposition: Outpatient   Postop Pain Control: Uneventful            Sign Out: Well controlled pain   PONV: No   Neuro/Psych: Uneventful            Sign Out: Acceptable/Baseline neuro status   Airway/Respiratory: Uneventful            Sign Out: Acceptable/Baseline resp. status   CV/Hemodynamics: Uneventful            Sign Out: Acceptable CV status; No obvious hypovolemia; No obvious fluid overload   Other NRE:    DID A NON-ROUTINE EVENT OCCUR?        Last vitals:  Vitals Value Taken Time   /77 03/03/25 0748   Temp 97.4  F (36.3  C) 03/03/25 0754   Pulse 73 03/03/25 0753   Resp 16 03/03/25 0754   SpO2 93 % 03/03/25 0755   Vitals shown include unfiled device data.    Electronically Signed By: Parth Swartz MD  March 3, 2025  1:47 PM

## 2025-03-03 NOTE — ANESTHESIA PREPROCEDURE EVALUATION
Anesthesia Pre-Procedure Evaluation    Patient: Krystyna Hunter   MRN: [id number] : 1965        Procedure : Procedure(s):  HYSTEROSCOPY, WITH DILATION AND CURETTAGE          Past Medical History:   Diagnosis Date     Dyspnea on exertion      Gastroesophageal reflux disease      Hypokalemia      Ileus following gastrointestinal surgery (H)      Menopausal symptoms 2016     Obese      Orthopnea      Thyroid disease       Past Surgical History:   Procedure Laterality Date     COLON SURGERY  2016    with colostomy     DILATION AND CURETTAGE, OPERATIVE HYSTEROSCOPY, COMBINED N/A 10/21/2021    Procedure: HYSTEROSCOPY, WITH DILATION AND CURETTAGE, WITH REMOVAL OF MIRENA DEVICE UNDER ULTRASOUND GUIDANCE;  Surgeon: Princess Lan DO;  Location: [facility] OR     REPAIR BLADDER       REVISION COLOSTOMY N/A 2017    Procedure: COLOSTOMY REVISION;  Surgeon: Shant Cameron MD;  [facility];  Service:       Allergies   Allergen Reactions     Claritin-D 12 Hour [Loratadine-Pseudoephedrine Er] Hives     Hydrocodone Unknown     Facial swelling     Nitrofurantoin Other (See Comments) and Swelling     numbness      Social History     Tobacco Use     Smoking status: Never     Smokeless tobacco: Never   Substance Use Topics     Alcohol use: Yes     Comment: Alcoholic Drinks/day: occasionaly, 2 times per week 3-4 glasses of adult beverages      Wt Readings from Last 1 Encounters:   25 77.1 kg (170 lb)        Anesthesia Evaluation            ROS/MED HX  ENT/Pulmonary:  - neg pulmonary ROS     Neurologic:  - neg neurologic ROS     Cardiovascular:     (+)  - -   -  - -           SALINAS.                           METS/Exercise Tolerance:     Hematologic:  - neg hematologic  ROS     Musculoskeletal:  - neg musculoskeletal ROS     GI/Hepatic:  - neg GI/hepatic ROS   (+) GERD, Asymptomatic on medication,                  Renal/Genitourinary:  - neg Renal ROS  (-) BPH   Endo:  - neg endo  "ROS   (+)          thyroid problem,     Obesity,       Psychiatric/Substance Use:  - neg psychiatric ROS     Infectious Disease:  - neg infectious disease ROS     Malignancy:  - neg malignancy ROS     Other:  - neg other ROS        Physical Exam    Airway  airway exam normal      Mallampati: II       Respiratory Devices and Support         Dental  no notable dental history         Cardiovascular   cardiovascular exam normal       Rhythm and rate: regular and normal     Pulmonary   pulmonary exam normal        breath sounds clear to auscultation       OUTSIDE LABS:  CBC: No results found for: \"WBC\", \"HGB\", \"HCT\", \"PLT\"  BMP:   Lab Results   Component Value Date    CR 0.72 04/28/2016     COAGS: No results found for: \"PTT\", \"INR\", \"FIBR\"  POC: No results found for: \"BGM\", \"HCG\", \"HCGS\"  HEPATIC: No results found for: \"ALBUMIN\", \"PROTTOTAL\", \"ALT\", \"AST\", \"GGT\", \"ALKPHOS\", \"BILITOTAL\", \"BILIDIRECT\", \"WILBUR\"  OTHER: No results found for: \"PH\", \"LACT\", \"A1C\", \"BILLY\", \"PHOS\", \"MAG\", \"LIPASE\", \"AMYLASE\", \"TSH\", \"T4\", \"T3\", \"CRP\", \"SED\"    Anesthesia Plan    ASA Status:  2       Anesthesia Type: MAC.   Induction: Intravenous.   Maintenance: TIVA.        Consents    Anesthesia Plan(s) and associated risks, benefits, and realistic alternatives discussed. Questions answered and patient/representative(s) expressed understanding.     - Discussed:     - Discussed with:  Patient, Spouse      - Extended Intubation/Ventilatory Support Discussed: No.      - Patient is DNR/DNI Status: No     Use of blood products discussed: No .     Postoperative Care    Pain management: IV analgesics, Oral pain medications.   PONV prophylaxis: Ondansetron (or other 5HT-3), Dexamethasone or Solumedrol     Comments:    Other Comments: The patient understands and accepts the risks of MAC anesthesia including (but not limited to) nausea, vomiting, dizziness, and chipped teeth. I also discussed the possibility of conversion to GAETT/GALMA anesthesia which " "include hoarse voice, sore throat, and pinched lip or chipped teeth.  Versed/fent  propofol ggt  Decadron/zofran               Parth Swartz MD    I have reviewed the pertinent notes and labs in the chart from the past 30 days and (re)examined the patient.  Any updates or changes from those notes are reflected in this note.    Clinically Significant Risk Factors Present on Admission                             # Obesity: Estimated body mass index is 32.12 kg/m  as calculated from the following:    Height as of this encounter: 1.549 m (5' 1\").    Weight as of this encounter: 77.1 kg (170 lb).                "

## 2025-03-03 NOTE — ANESTHESIA PROCEDURE NOTES
Airway       Patient location during procedure: OR  Staff -        Anesthesiologist:  Parth Swartz MD       CRNA: Cecille Mulligan APRN CRNA       Performed By: CRNA  Consent for Airway        Urgency: elective  Indications and Patient Condition       Indications for airway management: [place]-procedural       Induction type:intravenous       Mask difficulty assessment: 0 - not attempted    Final Airway Details       Final airway type: supraglottic airway    Supraglottic Airway Details        Type: LMA       Brand: Ambu AuraGain       LMA size: 4    Post intubation assessment        Placement verified by: capnometry, equal breath sounds and chest rise        Number of attempts at approach: 1       Secured with: tape       Ease of procedure: easy       Dentition: Intact and Unchanged

## 2025-03-03 NOTE — ANESTHESIA CARE TRANSFER NOTE
Patient: Krystyna Hunter    Procedure: Procedure(s):  Exam under anesthsia with endometrial biopsy       Diagnosis: PMB (postmenopausal bleeding) [N95.0]  Diagnosis Additional Information: No value filed.    Anesthesia Type:   General     Note:    Oropharynx: oropharynx clear of all foreign objects and spontaneously breathing  Level of Consciousness: awake and drowsy  Oxygen Supplementation: face mask  Level of Supplemental Oxygen (L/min / FiO2): 6  Independent Airway: airway patency satisfactory and stable  Dentition: dentition unchanged  Vital Signs Stable: post-procedure vital signs reviewed and stable  Report to RN Given: handoff report given  Patient transferred to: PACU    Handoff Report: Identifed the Patient, Identified the Reponsible Provider, Reviewed the pertinent medical history, Discussed the surgical course, Reviewed Intra-OP anesthesia mangement and issues during anesthesia, Set expectations for post-procedure period and Allowed opportunity for questions and acknowledgement of understanding      Vitals:  Vitals Value Taken Time   /78 03/03/25 0744   Temp 35.7  C (96.3  F) 03/03/25 0744   Pulse 80 03/03/25 0744   Resp 17 03/03/25 0744   SpO2 98 % 03/03/25 0744       Electronically Signed By: BLAKE Bejarano CRNA  March 3, 2025  7:46 AM

## 2025-03-03 NOTE — OP NOTE
Operative Note    Name: Krystyna Hunter   MRN: [id number]   DATE OF SERVICE:  3/3/2025     PREOPERATIVE DIAGNOSIS: Postmenopausal bleeding    POSTOPERATIVE DIAGNOSIS: Same    PROCEDURES: Examination under anesthesia, endometrial biopsy    SURGEON: Justice Leahy MD     ASSISTANT: Megan    ANESTHESIA:  MAC    ESTIMATED BLOOD LOSS:  <5mL    COMPLICATIONS: None, but hysteroscopy D&C portion was unable to be completed - see op note    INDICATION AND CONSENT: The patient is a 61 yo F with postmenopausal bleeding who underwent pelvic US showing an endometrial stripe > 5mm. Attempt was made at EMB and endosee in the office and unable to complete due to pt anatomy and discomfort. Pt has hx of stage 3 colon cancer with bowel resection and colostomy bag. Patient was counseled on risks/benefits of hysteroscopy, dilation and curettage, which include but are not limited to bleeding, blood transfusion, damage to tissues (bowel, bladder, uterus, uterine perforation, bladder, vagina, cervix, other structures), infection, risk of additional procedures, risk of anesthesia. Informed consent obtained, signed and placed in the patient's chart.     PROCEDURE:   The patient was taken to the operating room where anesthesia was administered and found to be adequate. The patient was placed in dorsal lithotomy position using yellow fin stirrups. Timeout completed. Bimanual examination was performed revealing an extremely anteverted, immobile, normal sized uterus with cervix in the most posterior section of vagina with no adnexal masses. She was prepped and draped in the normal sterile fashion. She voided prior to the OR. Speculum use did not allow for cervical visualization. The speculum was  and posterior blade was used for retraction. Cervix could be visualized with adjustments.  Additional vaginal retractors were utilized for improved visualization of the cervix with minimal improvement. Vaginal atrophy was noted during  exam. A single tooth tenaculum was placed on the anterior lip of the cervix. The smallest roque dilator was used, but could not be advanced passed the internal cervical os. The cervix was essentially immobile despite the tenaculum placement. The uterine sound was unable to be placed successfully due to the pt anatomy. An endometrial biopsy pipelle was then inserted and sample obtained, but scant sample and pipelle sounded only to 5cm, so not confident that any endometrial cells are present in sample. At this point, the decision was made to end the procedure as additional attempts at cervical dilation were noted to be futile.      Instruments were removed from vagina. No active bleeding was noted. Sponge and needle counts were correct X 2. She was taken to recovery in stable condition.    In summary, the patient's previous surgery likely caused such severe scar tissue posterior to the uterus to cause the vagina and uterus/cervix to be at 90 degrees without any mobility of the uterus/cervix. No instrument could be passed into the uterine cavity successfully. EMB sample obtained, but concerned it is not sufficient enough to rule-out endometrial hyperplasia or malignancy. Will need to see GYNONC. I did review this in detail with her family member after procedure was ended.     Justice Leahy MD, FACOG  OBGYN with [facility]  Text pager: [phone]  March 3, 2025 @ 7:53 AM

## 2025-03-03 NOTE — DISCHARGE INSTRUCTIONS
You have received 975 mg of Acetaminophen (Tylenol) at 6:10AM. Please do not take an additional dose of Tylenol until after 12:10PM     Do not exceed 4,000 mg of acetaminophen during a 24 hour period and keep in mind that acetaminophen can also be found in many over-the-counter cold medications as well as narcotics that may be given for pain.     You received a medication called Toradol (a stronger IV ibuprofen) at 7:30am. Do NOT take any Ibuprofen / Advil / Aleve / Naproxen or products containing Ibuprofen until 1:30pm or later.     Do not take more than 2000mg of NSAIDS, such as Advil and Ibuprofen, in 24hrs.      Apply ice to the incision site as needed for pain. Twenty minutes with the ice on and then twenty minutes with the ice off.      If you have any questions or concerns regarding your procedure please contact Dr. Leahy at [phone].      Adult Discharge Orders & Instructions     For 24 hours after surgery    Get plenty of rest.  A responsible adult must stay with you for at least 24 hours after you leave the hospital.   Do not drive or use heavy equipment.  If you have weakness or tingling, don't drive or use heavy equipment until this feeling goes away.  Do not drink alcohol.  Avoid strenuous or risky activities.  Ask for help when climbing stairs.   You may feel lightheaded.  IF so, sit for a few minutes before standing.  Have someone help you get up.   If you have nausea (feel sick to your stomach): Drink only clear liquids such as apple juice, ginger ale, broth or 7-Up.  Rest may also help.  Be sure to drink enough fluids.  Move to a regular diet as you feel able.  You may have a slight fever. Call the doctor if your fever is over 100 F (37.7 C) (taken under the tongue) or lasts longer than 24 hours.  You may have a dry mouth, a sore throat, muscle aches or trouble sleeping.  These should go away after 24 hours.  Do not make important or legal decisions.     Call your doctor for any of the  followin.  Signs of infection (fever, growing tenderness at the surgery site, a large amount of drainage or bleeding, severe pain, foul-smelling drainage, redness, swelling).    2. It has been over 8 to 10 hours since surgery and you are still not able to urinate (pass water).    3.  Headache for over 24 hours.    4.  Numbness, tingling or weakness in your legs the day after surgery (if you had spinal anesthesia).          Based on the surgery/procedure that you had today, we do not expect that you will have any problems.  However, we want you to know what to do if you have pain, nausea, bleeding, or infection:    To control pain:  Take medicines your physician has prescribed or or over-the counter medicine he or she advises.  Ice packs and periods of rest are often helpful.  For surgery on an arm or leg, raise it on a pillow to ease swelling.  If your pain is not managed with the above methods, contact your physician.    Copyright Christopher Jones, Licensed under [employer]    To control nausea:  Take anti-nausea medicine approved by your physician.  Drink clear liquids such as apple juice, ginger ale, broth or 7-Up. Be sure to drink enough fluids.  Move to a regular diet as you feel able.  Rest may also help.    Bleeding:  You may see a little blood on your dressing, about the size of a quarter in the first 24 hours.  If you see this, there is no reason to be alarmed.  However, if this continues to increase in size, apply pressure if able, and notify your physician.    Copyright [employer], Licensed under [employer]    Infection: Please contact your physician if you have any of the following signs:  redness, swelling, heat, increasing pain or foul-smelling drainage at your surgery site, fever or chills.           Please utilize vaginal estrogen cream that was sent to your pharmacy. Insert 1g vaginally every night for 1 week, then use 2 times per week for maintenance.

## 2025-03-03 NOTE — H&P
HISTORY AND PHYSICAL     Name: Krystyna Hunter  YOB: 1965  Medical Record Number: [id number]    History of Present Illness: Krystyna Hunter is a 60 year old female who is here for hysteroscopy, D&C for postmenopausal bleeding. Endosee/EMB was attempted in office but unable to perform due to anatomy and pt discomfort.     Past Medical History:   Diagnosis Date    Dyspnea on exertion     Gastroesophageal reflux disease     Hypokalemia     Ileus following gastrointestinal surgery (H)     Menopausal symptoms 04/28/2016    Obese     Orthopnea     Thyroid disease      Past Surgical History:   Procedure Laterality Date    COLON SURGERY  04/2016    with colostomy    DILATION AND CURETTAGE, OPERATIVE HYSTEROSCOPY, COMBINED N/A 10/21/2021    Procedure: HYSTEROSCOPY, WITH DILATION AND CURETTAGE, WITH REMOVAL OF MIRENA DEVICE UNDER ULTRASOUND GUIDANCE;  Surgeon: Princess Lan DO;  Location: [facility] OR    REPAIR BLADDER  2014    REVISION COLOSTOMY N/A 7/6/2017    Procedure: COLOSTOMY REVISION;  Surgeon: Shant Cameron MD;  Location: [facility] [place];  Service:      Current Outpatient Medications   Medication Sig Dispense Refill    B Complex Vitamins (B COMPLEX 1 PO) B Complex      cholecalciferol, vitamin D3, 4,000 unit cap [CHOLECALCIFEROL, VITAMIN D3, 4,000 UNIT CAP] Take 1 tablet by mouth daily.      clobetasol (TEMOVATE) 0.05 % external cream Apply topically 2 times daily      estradiol (ESTRACE) 0.01 % (0.1 mg/gram) vaginal cream [ESTRADIOL (ESTRACE) 0.01 % (0.1 MG/GRAM) VAGINAL CREAM] Insert 2 g into the vagina every other day.       ibuprofen (ADVIL/MOTRIN) 800 MG tablet Take 1 tablet (800 mg) by mouth every 6 hours as needed for other (mild and/or inflammatory pain) 30 tablet 0    Lactobacillus rhamnosus GG (CULTURELLE) 10-15 Billion cell capsule [LACTOBACILLUS RHAMNOSUS GG (CULTURELLE) 10-15 BILLION CELL CAPSULE] Take 1 capsule by mouth daily.      levothyroxine (SYNTHROID, LEVOTHROID)  "100 MCG tablet [LEVOTHYROXINE (SYNTHROID, LEVOTHROID) 100 MCG TABLET] Take 100 mcg by mouth daily.      liothyronine (CYTOMEL) 5 MCG tablet [LIOTHYRONINE (CYTOMEL) 5 MCG TABLET] Take 5 mcg by mouth daily.      multivitamin (ONE A DAY) per tablet [MULTIVITAMIN (ONE A DAY) PER TABLET] Take 1 tablet by mouth daily.      progesterone (PROMETRIUM) 200 MG capsule Take by mouth.      coQ10, ubiquinol, 100 mg cap [COQ10, UBIQUINOL, 100 MG CAP] Take 1 capsule by mouth 2 (two) times a day.      estradiol (VIVELLE-DOT) 0.1 mg/24 hr [ESTRADIOL (VIVELLE-DOT) 0.1 MG/24 HR] Place 1 patch on the skin 2 (two) times a week.   2    solifenacin (VESICARE) 5 MG tablet [SOLIFENACIN (VESICARE) 5 MG TABLET] Take 5 mg by mouth daily.      triamcinolone (KENALOG) 0.025 % cream Apply topically 2 times daily       Current Facility-Administered Medications   Medication Dose Route Frequency Provider Last Rate Last Admin    acetaminophen (TYLENOL) tablet 975 mg  975 mg Oral Once [facility], Justice Rodriguez MD        lactated ringers infusion   Intravenous Continuous Parth Swartz  mL/hr at 03/03/25 0618 New Bag at 03/03/25 0618    lidocaine (LMX4) kit   Topical Q1H PRN Parth Swartz MD        lidocaine 1 % 0.1-1 mL  0.1-1 mL Other Q1H PRN Parth Swartz MD        sodium chloride (PF) 0.9% PF flush 3 mL  3 mL Intracatheter Q8H Parth Swartz MD        sodium chloride (PF) 0.9% PF flush 3 mL  3 mL Intracatheter q1 min prn Parth Swartz MD            Allergies   Allergen Reactions    Claritin-D 12 Hour [Loratadine-Pseudoephedrine Er] Hives    Hydrocodone Unknown     Facial swelling    Nitrofurantoin Other (See Comments) and Swelling     numbness      ROS: see HPI    Exam:      BP (!) 168/82   Temp 96.9  F (36.1  C) (Temporal)   Resp 16   Ht 1.549 m (5' 1\")   Wt 77.1 kg (170 lb)   SpO2 97%   BMI 32.12 kg/m      Gen: NAD  CV: Normal rate  Pulm: Normal WOB  Abd: soft, NT, ND, colostomy bag present  Extr: no edema, " non-tender  Neuro: A&Ox3      Plan: 59 yo F with postmenopausal bleeding  Admit for hysteroscopy, D&C  Consents obtained, signed and in chart  No preop antibiotics indicated    Proceed to OR    Justice Leahy MD    3/3/2025   6:40 AM

## 2025-05-12 RX ORDER — TRIAMTERENE AND HYDROCHLOROTHIAZIDE 37.5; 25 MG/1; MG/1
1 CAPSULE ORAL DAILY
COMMUNITY

## 2025-05-13 ENCOUNTER — ANESTHESIA EVENT (OUTPATIENT)
Dept: SURGERY | Facility: AMBULATORY SURGERY CENTER | Age: 60
End: 2025-05-13

## 2025-05-14 ENCOUNTER — ANESTHESIA (OUTPATIENT)
Dept: SURGERY | Facility: AMBULATORY SURGERY CENTER | Age: 60
End: 2025-05-14

## 2025-05-14 ENCOUNTER — HOSPITAL ENCOUNTER (OUTPATIENT)
Facility: AMBULATORY SURGERY CENTER | Age: 60
Discharge: HOME | End: 2025-05-14
Attending: COLON & RECTAL SURGERY

## 2025-05-14 VITALS
WEIGHT: 170 LBS | HEIGHT: 61 IN | BODY MASS INDEX: 32.1 KG/M2 | RESPIRATION RATE: 15 BRPM | DIASTOLIC BLOOD PRESSURE: 65 MMHG | TEMPERATURE: 96.8 F | OXYGEN SATURATION: 96 % | SYSTOLIC BLOOD PRESSURE: 120 MMHG | HEART RATE: 54 BPM

## 2025-05-14 DIAGNOSIS — Z85.038 HISTORY OF COLON CANCER: ICD-10-CM

## 2025-05-14 LAB — COLONOSCOPY: NORMAL

## 2025-05-14 RX ORDER — ONDANSETRON 2 MG/ML
4 INJECTION INTRAMUSCULAR; INTRAVENOUS EVERY 30 MIN PRN
Status: DISCONTINUED | OUTPATIENT
Start: 2025-05-14 | End: 2025-05-15 | Stop reason: HOSPADM

## 2025-05-14 RX ORDER — SODIUM CHLORIDE, SODIUM LACTATE, POTASSIUM CHLORIDE, CALCIUM CHLORIDE 600; 310; 30; 20 MG/100ML; MG/100ML; MG/100ML; MG/100ML
INJECTION, SOLUTION INTRAVENOUS CONTINUOUS
Status: DISCONTINUED | OUTPATIENT
Start: 2025-05-14 | End: 2025-05-15 | Stop reason: HOSPADM

## 2025-05-14 RX ORDER — NALOXONE HYDROCHLORIDE 0.4 MG/ML
0.1 INJECTION, SOLUTION INTRAMUSCULAR; INTRAVENOUS; SUBCUTANEOUS
Status: DISCONTINUED | OUTPATIENT
Start: 2025-05-14 | End: 2025-05-15 | Stop reason: HOSPADM

## 2025-05-14 RX ORDER — FENTANYL CITRATE 0.05 MG/ML
25 INJECTION, SOLUTION INTRAMUSCULAR; INTRAVENOUS
Status: DISCONTINUED | OUTPATIENT
Start: 2025-05-14 | End: 2025-05-15 | Stop reason: HOSPADM

## 2025-05-14 RX ORDER — FENTANYL CITRATE 0.05 MG/ML
50 INJECTION, SOLUTION INTRAMUSCULAR; INTRAVENOUS EVERY 5 MIN PRN
Status: DISCONTINUED | OUTPATIENT
Start: 2025-05-14 | End: 2025-05-15 | Stop reason: HOSPADM

## 2025-05-14 RX ORDER — HYDROMORPHONE HCL IN WATER/PF 6 MG/30 ML
0.4 PATIENT CONTROLLED ANALGESIA SYRINGE INTRAVENOUS EVERY 5 MIN PRN
Status: DISCONTINUED | OUTPATIENT
Start: 2025-05-14 | End: 2025-05-15 | Stop reason: HOSPADM

## 2025-05-14 RX ORDER — FENTANYL CITRATE 0.05 MG/ML
25 INJECTION, SOLUTION INTRAMUSCULAR; INTRAVENOUS EVERY 5 MIN PRN
Status: DISCONTINUED | OUTPATIENT
Start: 2025-05-14 | End: 2025-05-15 | Stop reason: HOSPADM

## 2025-05-14 RX ORDER — ONDANSETRON 4 MG/1
4 TABLET, ORALLY DISINTEGRATING ORAL EVERY 30 MIN PRN
Status: DISCONTINUED | OUTPATIENT
Start: 2025-05-14 | End: 2025-05-15 | Stop reason: HOSPADM

## 2025-05-14 RX ORDER — OXYCODONE HYDROCHLORIDE 10 MG/1
10 TABLET ORAL
Status: DISCONTINUED | OUTPATIENT
Start: 2025-05-14 | End: 2025-05-15 | Stop reason: HOSPADM

## 2025-05-14 RX ORDER — ONDANSETRON 4 MG/1
4 TABLET, ORALLY DISINTEGRATING ORAL
Status: DISCONTINUED | OUTPATIENT
Start: 2025-05-14 | End: 2025-05-15 | Stop reason: HOSPADM

## 2025-05-14 RX ORDER — ACETAMINOPHEN 325 MG/1
975 TABLET ORAL ONCE
Status: DISCONTINUED | OUTPATIENT
Start: 2025-05-14 | End: 2025-05-15 | Stop reason: HOSPADM

## 2025-05-14 RX ORDER — PROPOFOL 10 MG/ML
INJECTION, EMULSION INTRAVENOUS CONTINUOUS PRN
Status: DISCONTINUED | OUTPATIENT
Start: 2025-05-14 | End: 2025-05-14

## 2025-05-14 RX ORDER — GLYCOPYRROLATE 0.2 MG/ML
INJECTION, SOLUTION INTRAMUSCULAR; INTRAVENOUS PRN
Status: DISCONTINUED | OUTPATIENT
Start: 2025-05-14 | End: 2025-05-14

## 2025-05-14 RX ORDER — HYDROMORPHONE HCL IN WATER/PF 6 MG/30 ML
0.2 PATIENT CONTROLLED ANALGESIA SYRINGE INTRAVENOUS EVERY 5 MIN PRN
Status: DISCONTINUED | OUTPATIENT
Start: 2025-05-14 | End: 2025-05-15 | Stop reason: HOSPADM

## 2025-05-14 RX ORDER — DEXAMETHASONE SODIUM PHOSPHATE 4 MG/ML
4 INJECTION, SOLUTION INTRA-ARTICULAR; INTRALESIONAL; INTRAMUSCULAR; INTRAVENOUS; SOFT TISSUE
Status: DISCONTINUED | OUTPATIENT
Start: 2025-05-14 | End: 2025-05-15 | Stop reason: HOSPADM

## 2025-05-14 RX ORDER — LIDOCAINE 40 MG/G
CREAM TOPICAL
Status: DISCONTINUED | OUTPATIENT
Start: 2025-05-14 | End: 2025-05-15 | Stop reason: HOSPADM

## 2025-05-14 RX ORDER — OXYCODONE HYDROCHLORIDE 5 MG/1
5 TABLET ORAL
Status: DISCONTINUED | OUTPATIENT
Start: 2025-05-14 | End: 2025-05-15 | Stop reason: HOSPADM

## 2025-05-14 RX ADMIN — PROPOFOL 150 MCG/KG/MIN: 10 INJECTION, EMULSION INTRAVENOUS at 11:13

## 2025-05-14 RX ADMIN — SODIUM CHLORIDE, SODIUM LACTATE, POTASSIUM CHLORIDE, CALCIUM CHLORIDE: 600; 310; 30; 20 INJECTION, SOLUTION INTRAVENOUS at 09:58

## 2025-05-14 RX ADMIN — GLYCOPYRROLATE 0.2 MG: 0.2 INJECTION, SOLUTION INTRAMUSCULAR; INTRAVENOUS at 11:13

## 2025-05-14 ASSESSMENT — LIFESTYLE VARIABLES: TOBACCO_USE: 0

## 2025-05-14 NOTE — DISCHARGE INSTRUCTIONS
Post-Colonoscopy Discharge Instructions:    1. You have just had an examination of your colon (large intestine) called a colonoscopy. You should have a full report of the findings to take with you today. It will list if any polyps were removed or if any biopsies were taken to send to the laboratory. If we did take out polyps or do biopsies, you should get a letter in the next 1-2 weeks if they are the standard pre-cancerous polyps. If it is anything more serious, your doctor will call you. The timing of your next colonoscopy is determined by your family history of polyps/colon cancer and what findings were on your colonoscopy. [facility] will keep track of when you are due for your next colonoscopy and contact you.     2. No driving or operating heavy power equipment today.    3. Do not drink alcohol for 12 hours after the procedure.     4. Do not sign any important or legally binding papers today.    5. No strenuous activity today (its a great day for a nap and to lay on the couch and watch some TV!).     6. You can eat whatever you like after your procedure.     7. You may experience drowsiness or forgetfulness at first. You may also notice altered bowel habits, excessive gas and belching or bloated feeling.    8. If recommended by your physician, you should avoid Aspirin, Aspirin products and arthritis medications for 7 - 10 days following the exam if biopsies or polypectomies have been performed.    9. You may develop soreness or redness where your IV medication was given. Apply warm wet cloths to the area for 15 minutes 3 - 4 times per day.    10. You may have a bloated, gaseous feeling in your abdomen after a colonoscopy for the next few hours. Passing gas and belching will help. Walking or lying down on your left side with your knees flexed may relieve the discomfort.    11. Call the office at [phone] right away if you notice any of the following:  a. Vomiting of blood and/or   coffee ground  material.  b. Rectal bleeding - >1Tbsp, blood clots, or continuous bleeding.  c. Severe abdominal pain.  d. Chills, fever over 101 degrees F.  e. Persistent nausea or vomiting.  f. Persistent disorientation/confusion.  g. Persistent coughing or spitting up blood.  h. Persistent redness, hardness, or tenderness at IV site.      If you have any questions or concerns regarding your procedure please contact Dr. Turk, his office number is [phone]    Adult Discharge Orders & Instructions     For 24 hours after surgery    Get plenty of rest.  A responsible adult must stay with you for at least 24 hours after you leave the hospital.   Do not drive or use heavy equipment.  If you have weakness or tingling, don't drive or use heavy equipment until this feeling goes away.  Do not drink alcohol.  Avoid strenuous or risky activities.  Ask for help when climbing stairs.   You may feel lightheaded.  IF so, sit for a few minutes before standing.  Have someone help you get up.   If you have nausea (feel sick to your stomach): Drink only clear liquids such as apple juice, ginger ale, broth or 7-Up.  Rest may also help.  Be sure to drink enough fluids.  Move to a regular diet as you feel able.  You may have a slight fever. Call the doctor if your fever is over 100 F (37.7 C) (taken under the tongue) or lasts longer than 24 hours.  You may have a dry mouth, a sore throat, muscle aches or trouble sleeping.  These should go away after 24 hours.  Do not make important or legal decisions.     Call your doctor for any of the followin.  Signs of infection (fever, growing tenderness at the surgery site, a large amount of drainage or bleeding, severe pain, foul-smelling drainage, redness, swelling).    2. It has been over 8 to 10 hours since surgery and you are still not able to urinate (pass water).    3.  Headache for over 24 hours.    4.  Numbness, tingling or weakness in your legs the day after surgery (if you  had spinal anesthesia).          Based on the surgery/procedure that you had today, we do not expect that you will have any problems.  However, we want you to know what to do if you have pain, nausea, bleeding, or infection:    To control pain:  Take medicines your physician has prescribed or or over-the counter medicine he or she advises.  Ice packs and periods of rest are often helpful.  For surgery on an arm or leg, raise it on a pillow to ease swelling.  If your pain is not managed with the above methods, contact your physician.    Copyright Christopher [employer], Licensed under [employer] Fio    To control nausea:  Take anti-nausea medicine approved by your physician.  Drink clear liquids such as apple juice, ginger ale, broth or 7-Up. Be sure to drink enough fluids.  Move to a regular diet as you feel able.  Rest may also help.    Bleeding:  You may see a little blood on your dressing, about the size of a quarter in the first 24 hours.  If you see this, there is no reason to be alarmed.  However, if this continues to increase in size, apply pressure if able, and notify your physician.    Copyright [employer], Licensed under [employer]    Infection: Please contact your physician if you have any of the following signs:  redness, swelling, heat, increasing pain or foul-smelling drainage at your surgery site, fever or chills.

## 2025-05-14 NOTE — ANESTHESIA PREPROCEDURE EVALUATION
Anesthesia Pre-Procedure Evaluation    Patient: Krystyna Hunter   MRN: [id number] : 1965          Procedure : Procedure(s):  COLONOSCOPY         Past Medical History:   Diagnosis Date    Dyspnea on exertion     Gastroesophageal reflux disease     Hypertension     Hypokalemia     Ileus following gastrointestinal surgery (H)     Menopausal symptoms 2016    Obese     Orthopnea     Thyroid disease       Past Surgical History:   Procedure Laterality Date    COLON SURGERY  2016    with colostomy    COLPOSCOPY          DILATION AND CURETTAGE, OPERATIVE HYSTEROSCOPY, COMBINED N/A 10/21/2021    Procedure: HYSTEROSCOPY, WITH DILATION AND CURETTAGE, WITH REMOVAL OF MIRENA DEVICE UNDER ULTRASOUND GUIDANCE;  Surgeon: Princess Lan DO;  Location: [facility]    DILATION AND CURETTAGE, OPERATIVE HYSTEROSCOPY, COMBINED N/A 2025    Procedure: Exam under anesthsia with endometrial biopsy;  Surgeon: Justice Leahy MD;  Location: [facility] OR    POUCHOSCOPY      REPAIR BLADDER  2014    REVISION COLOSTOMY N/A 2017    Procedure: COLOSTOMY REVISION;  Surgeon: Shant Cameron MD;  Location: [facility] OR;  Service:       Allergies   Allergen Reactions    Claritin-D 12 Hour [Loratadine-Pseudoephedrine Er] Hives    Hydrocodone Unknown     Facial swelling    Nitrofurantoin Other (See Comments) and Swelling     numbness      Social History     Tobacco Use    Smoking status: Never    Smokeless tobacco: Never   Substance Use Topics    Alcohol use: Yes     Comment: Alcoholic Drinks/day: occasionaly, 2 times per week 3-4 glasses of adult beverages      Wt Readings from Last 1 Encounters:   25 77.1 kg (170 lb)        Anesthesia Evaluation   Pt has had prior anesthetic. Type: MAC.    No history of anesthetic complications       ROS/MED HX  ENT/Pulmonary:    (-) tobacco use, asthma, sleep apnea and recent URI   Neurologic:  - neg neurologic ROS     Cardiovascular:    "  (+)  hypertension- -   -  - -                                      METS/Exercise Tolerance:     Hematologic:  - neg hematologic  ROS     Musculoskeletal:  - neg musculoskeletal ROS     GI/Hepatic:       Renal/Genitourinary:       Endo:     (+)          thyroid problem, hypothyroidism,    Obesity,       Psychiatric/Substance Use:  - neg psychiatric ROS     Infectious Disease:       Malignancy:   (+) Malignancy, History of GI.GI CA  Remission status post.      Other:              Physical Exam  Airway  Mallampati: II  TM distance: >3 FB  Neck ROM: full    Cardiovascular - normal exam  Rhythm: regular  Rate: normal rate     Dental   (+) Completely normal teeth      Pulmonary - normal examBreath sounds clear to auscultation        Neurological   Other Findings       OUTSIDE LABS:  CBC: No results found for: \"WBC\", \"HGB\", \"HCT\", \"PLT\"  BMP:   Lab Results   Component Value Date    CR 0.72 04/28/2016     COAGS: No results found for: \"PTT\", \"INR\", \"FIBR\"  POC: No results found for: \"BGM\", \"HCG\", \"HCGS\"  HEPATIC: No results found for: \"ALBUMIN\", \"PROTTOTAL\", \"ALT\", \"AST\", \"GGT\", \"ALKPHOS\", \"BILITOTAL\", \"BILIDIRECT\", \"WILBUR\"  OTHER: No results found for: \"PH\", \"LACT\", \"A1C\", \"BILLY\", \"PHOS\", \"MAG\", \"LIPASE\", \"AMYLASE\", \"TSH\", \"T4\", \"T3\", \"CRP\", \"SED\"    Anesthesia Plan    ASA Status:  2      NPO Status: NPO Appropriate   Anesthesia Type: MAC.           Consents            Postoperative Care            Comments:                   Anastasiya Kearney MD    I have reviewed the pertinent notes and labs in the chart from the past 30 days and (re)examined the patient.  Any updates or changes from those notes are reflected in this note.    Clinically Significant Risk Factors Present on Admission                             # Obesity: Estimated body mass index is 32.12 kg/m  as calculated from the following:    Height as of this encounter: 1.549 m (5' 1\").    Weight as of this encounter: 77.1 kg (170 lb).                    "

## 2025-05-14 NOTE — ANESTHESIA CARE TRANSFER NOTE
Patient: Krystyna Hunter    Procedure: Procedure(s):  COLONOSCOPY       Diagnosis: History of colon cancer [Z85.038]  Diagnosis Additional Information: No value filed.    Anesthesia Type:   MAC     Note:    Oropharynx: oropharynx clear of all foreign objects  Level of Consciousness: awake and drowsy  Oxygen Supplementation: face mask  Level of Supplemental Oxygen (L/min / FiO2): 10  Independent Airway: airway patency satisfactory and stable  Dentition: dentition unchanged  Vital Signs Stable: post-procedure vital signs reviewed and stable  Report to RN Given: handoff report given  Patient transferred to: Phase II    Handoff Report: Identifed the Patient, Identified the Reponsible Provider, Reviewed the pertinent medical history, Discussed the surgical course, Reviewed Intra-OP anesthesia mangement and issues during anesthesia, Set expectations for post-procedure period and Allowed opportunity for questions and acknowledgement of understanding      Vitals:  Vitals Value Taken Time   /65 05/14/25 12:00   Temp 96.8  F (36  C) 05/14/25 11:45   Pulse 54 05/14/25 12:02   Resp 15 05/14/25 12:00   SpO2 96 % 05/14/25 12:02       Electronically Signed By: BLAKE Hairston CRNA  May 14, 2025  1:17 PM

## 2025-05-14 NOTE — ANESTHESIA POSTPROCEDURE EVALUATION
Patient: Krystyna Hunter    Procedure: Procedure(s):  COLONOSCOPY       Anesthesia Type:  MAC    Note:  Disposition: Outpatient   Postop Pain Control: Uneventful            Sign Out: Well controlled pain   PONV: No   Neuro/Psych: Uneventful            Sign Out: Acceptable/Baseline neuro status   Airway/Respiratory: Uneventful            Sign Out: Acceptable/Baseline resp. status   CV/Hemodynamics: Uneventful            Sign Out: Acceptable CV status; No obvious hypovolemia; No obvious fluid overload   Other NRE: NONE   DID A NON-ROUTINE EVENT OCCUR? No           Last vitals:  Vitals Value Taken Time   /65 05/14/25 12:00   Temp 96.8  F (36  C) 05/14/25 11:45   Pulse 54 05/14/25 12:02   Resp 15 05/14/25 12:00   SpO2 96 % 05/14/25 12:02       Electronically Signed By: Anastasiya Kearney MD  May 14, 2025  1:04 PM

## 2025-05-14 NOTE — H&P
[facility]   History and Physical       History of Present Illness: 60 year old female w/ hx of rectal cancer s/p APR > 5 years ago here for surveillance colonoscopy. Asymptomatic.     Past Medical History:   Diagnosis Date    Dyspnea on exertion     Gastroesophageal reflux disease     Hypertension     Hypokalemia     Ileus following gastrointestinal surgery (H)     Menopausal symptoms 04/28/2016    Obese     Orthopnea     Thyroid disease        Allergies   Allergen Reactions    Claritin-D 12 Hour [Loratadine-Pseudoephedrine Er] Hives    Hydrocodone Unknown     Facial swelling    Nitrofurantoin Other (See Comments) and Swelling     numbness       Past Surgical History:   Procedure Laterality Date    COLON SURGERY  04/01/2016    with colostomy    COLPOSCOPY      2025    DILATION AND CURETTAGE, OPERATIVE HYSTEROSCOPY, COMBINED N/A 10/21/2021    Procedure: HYSTEROSCOPY, WITH DILATION AND CURETTAGE, WITH REMOVAL OF MIRENA DEVICE UNDER ULTRASOUND GUIDANCE;  Surgeon: Princess Lan DO;  Location: [facility]    DILATION AND CURETTAGE, OPERATIVE HYSTEROSCOPY, COMBINED N/A 03/03/2025    Procedure: Exam under anesthsia with endometrial biopsy;  Surgeon: Justice Leahy MD;  Location: [facility] OR    POUCHOSCOPY      REPAIR BLADDER  01/01/2014    REVISION COLOSTOMY N/A 07/06/2017    Procedure: COLOSTOMY REVISION;  Surgeon: Shant Cameron MD;  Location: [facility];  Service:        Family History   Problem Relation Age of Onset    Cerebrovascular Disease Mother     Cerebral aneurysm Mother     Breast Cancer No family hx of     Anesthesia Reaction No family hx of     Malignant Hyperthermia No family hx of        Social History     Socioeconomic History    Marital status:      Spouse name: Not on file    Number of children: Not on file    Years of education: Not on file    Highest education level: Not on file   Occupational History    Not on file   Tobacco Use     Smoking status: Never    Smokeless tobacco: Never   Substance and Sexual Activity    Alcohol use: Yes     Comment: Alcoholic Drinks/day: occasionaly, 2 times per week 3-4 glasses of adult beverages    Drug use: No    Sexual activity: Not on file   Other Topics Concern    Not on file   Social History Narrative    Not on file     Social Drivers of Health     Financial Resource Strain: Not on file   Food Insecurity: Not on file   Transportation Needs: Not on file   Physical Activity: Not on file   Stress: Not on file   Social Connections: Not on file   Interpersonal Safety: Low Risk  (5/12/2025)    Interpersonal Safety     Do you feel physically and emotionally safe where you currently live?: Yes     Within the past 12 months, have you been hit, slapped, kicked or otherwise physically hurt by someone?: No     Within the past 12 months, have you been humiliated or emotionally abused in other ways by your partner or ex-partner?: No   Housing Stability: Not on file       Current Outpatient Medications   Medication Sig Dispense Refill    B Complex Vitamins (B COMPLEX 1 PO) B Complex      cholecalciferol, vitamin D3, 4,000 unit cap [CHOLECALCIFEROL, VITAMIN D3, 4,000 UNIT CAP] Take 1 tablet by mouth daily.      clobetasol (TEMOVATE) 0.05 % external cream Apply topically 2 times daily      estradiol (ESTRACE) 0.1 MG/GM vaginal cream Place 1 g vaginally twice a week. 42.5 g 2    ibuprofen (ADVIL/MOTRIN) 800 MG tablet Take 1 tablet (800 mg) by mouth every 6 hours as needed for other (mild and/or inflammatory pain) 30 tablet 0    Lactobacillus rhamnosus GG (CULTURELLE) 10-15 Billion cell capsule [LACTOBACILLUS RHAMNOSUS GG (CULTURELLE) 10-15 BILLION CELL CAPSULE] Take 1 capsule by mouth daily.      levothyroxine (SYNTHROID, LEVOTHROID) 100 MCG tablet [LEVOTHYROXINE (SYNTHROID, LEVOTHROID) 100 MCG TABLET] Take 100 mcg by mouth daily.      liothyronine (CYTOMEL) 5 MCG tablet [LIOTHYRONINE (CYTOMEL) 5 MCG TABLET] Take 5 mcg by  mouth daily.      multivitamin (ONE A DAY) per tablet [MULTIVITAMIN (ONE A DAY) PER TABLET] Take 1 tablet by mouth daily.      triamcinolone (KENALOG) 0.025 % cream Apply topically 2 times daily      triamterene-HCTZ (DYAZIDE) 37.5-25 MG capsule Take 1 capsule by mouth daily.      coQ10, ubiquinol, 100 mg cap [COQ10, UBIQUINOL, 100 MG CAP] Take 1 capsule by mouth 2 (two) times a day.      estradiol (VIVELLE-DOT) 0.1 mg/24 hr [ESTRADIOL (VIVELLE-DOT) 0.1 MG/24 HR] Place 1 patch on the skin 2 (two) times a week.   2    solifenacin (VESICARE) 5 MG tablet [SOLIFENACIN (VESICARE) 5 MG TABLET] Take 5 mg by mouth daily.       Current Facility-Administered Medications   Medication Dose Route Frequency Provider Last Rate Last Admin    lactated ringers infusion   Intravenous Continuous John Glez  mL/hr at 05/14/25 0958 New Bag at 05/14/25 0958    lidocaine (LMX4) kit   Topical Q1H PRN John Glez MD        lidocaine 1 % 0.1-1 mL  0.1-1 mL Other Q1H PRN John Glez MD        sodium chloride (PF) 0.9% PF flush 3 mL  3 mL Intracatheter Q8H John Glez MD        sodium chloride (PF) 0.9% PF flush 3 mL  3 mL Intracatheter q1 min prn John Glez MD           Review of Systems:   A full 10 point review of systems was taken and is negative aside from what is noted above in the HPI.    Consitutional / General: Denies wt changes, fevers, chills or sweats.  Skin: Denies rashes, bruising, pruritis, or bleeding tendencies.   ENT: Denies trauma, headache, hearing loss, tinnitus, dysphagia  Eyes: Denies double vision  Respiratory: Denies SOB, cough, sputum production or dyspnea on exertion.   Cardiovascular: Denies chest pain, palpitations, orthostatic hypotension  Hematology / Lymph: Denies hx of blood clots or pulmonary embolism  Gastrointestinal:  Denies loss of appetite, dysphagia, N/V, constipation or diarrhea.   Genitourinary: Denies dysuria, frequency, urgency, hesitancy, incontinence, nocturia,  "hematuria, difficulty starting or stopping their stream, hx of stones or hx of uti's.   Neurologic: Denies headache, LOC, memory loss, vertigo, syncope or seizures.   Musculoskeletal: Denies back pain, numbness, tingling or hx of back surgery  Psychological: alert and oriented    Intake/Output past 24 hrs:  No intake or output data in the 24 hours ending 05/14/25 1105    Physical Exam:  Temp:  [97  F (36.1  C)] 97  F (36.1  C)  Pulse:  [64] 64  Resp:  [16] 16  BP: (132)/(85) 132/85  SpO2:  [96 %] 96 %  General: NAD, alert, cooperative  Head: normocephalic, without abnormality / atraumatic  Respiratory: non-labored breathing, CTA-B  Cardiac: RRR +S1/S2  Abdomen: soft, non-tender, non-distended.  No guarding or rebound   Perianal/Rectal: deferred   Skin: no rashes or lesions  Musculoskeletal: moves all four extremities equally; no calf edema or tenderness  Psychological: alert and oriented, answers questions appropriately  Neurological: cranial nerves grossly intact    CBC RESULTS: No results for input(s): \"WBC\", \"RBC\", \"HGB\", \"HCT\", \"MCV\", \"MCH\", \"MCHC\", \"RDW\", \"PLT\" in the last 23218 hours.    Last Comprehensive Metabolic Panel:  Creatinine   Date Value Ref Range Status   04/28/2016 0.72 0.60 - 1.10 mg/dL Final     GFR Estimate   Date Value Ref Range Status   04/28/2016 >60 >60 mL/min/1.73m2 Final       Assessment: Krystyna Hunter is a 60 year old female presenting for surveillance colonoscopy.     Plan: Colonoscopy under LINDA Turk MD, ERICA  [facility]  Office: [phone]  5/14/2025 11:05 AM       "

## 2025-08-10 ENCOUNTER — HEALTH MAINTENANCE LETTER (OUTPATIENT)
Age: 60
End: 2025-08-10